# Patient Record
Sex: FEMALE | Race: WHITE | Employment: OTHER | URBAN - METROPOLITAN AREA
[De-identification: names, ages, dates, MRNs, and addresses within clinical notes are randomized per-mention and may not be internally consistent; named-entity substitution may affect disease eponyms.]

---

## 2017-01-16 ENCOUNTER — TELEPHONE (OUTPATIENT)
Dept: INTERNAL MEDICINE CLINIC | Age: 47
End: 2017-01-16

## 2017-01-16 NOTE — TELEPHONE ENCOUNTER
Pt transferred to nurse due to complain of chest pain. Pt complaining of mild chest pressure for 4 days. Pain is alleviated with ASA but returns as soon as ASA wears off. Pt denies any other symptoms, no radiating pain to shoulder, arm, back, neck or jaw. No nausea, sweats, HA or injury she is aware of. Pain not keeping her up at night. Advised of appt in office but if symptoms worse, change or she develops any of the additional symptoms discussed to be seen in ED. Pt understood and agrees to plan.

## 2017-01-17 ENCOUNTER — OFFICE VISIT (OUTPATIENT)
Dept: INTERNAL MEDICINE CLINIC | Age: 47
End: 2017-01-17

## 2017-01-17 VITALS — OXYGEN SATURATION: 97 % | RESPIRATION RATE: 20 BRPM | BODY MASS INDEX: 28.79 KG/M2 | HEIGHT: 68 IN | WEIGHT: 190 LBS

## 2017-01-17 DIAGNOSIS — R07.89 OTHER CHEST PAIN: Primary | ICD-10-CM

## 2017-01-17 DIAGNOSIS — E03.4 HYPOTHYROIDISM DUE TO ACQUIRED ATROPHY OF THYROID: ICD-10-CM

## 2017-01-17 DIAGNOSIS — E83.51 HYPOCALCEMIA: ICD-10-CM

## 2017-01-17 NOTE — MR AVS SNAPSHOT
Visit Information Date & Time Provider Department Dept. Phone Encounter #  
 1/17/2017 10:15 AM Delaney Eisenberg MD [facility] [address] [id number] Your Appointments 2/10/2017  9:20 AM  
Any with Ronnie Hdez MD  
[address] ([address]) Appt Note: follow up_ ref by Dr Sanford Moreira last seen 3/11/14 by Dr Nicole Lester snoring, migraines_ [address]  [address] [place], [place] [phone] [address] [facility] [address]  
  
    
 6/30/2017  8:40 AM  
Follow Up with Emelina Ramires MD  
[facility]) Appt Note: follow up headache  $0CP  giana  12/30/16 [place] [address] [facility] [address] [phone]  
  
   
 [place] [address] [address] [address] Upcoming Health Maintenance Date Due  
 PAP AKA CERVICAL CYTOLOGY 6/2/2017 DTaP/Tdap/Td series (2 - Td) 7/21/2026 Allergies as of 1/17/2017  Review Complete On: 1/17/2017 By: Delaney Eisenberg MD  
  
 Severity Noted Reaction Type Reactions Codeine  08/18/2010    Not Reported This Time Compazine [Prochlorperazine Edisylate]  08/18/2010    Not Reported This Time  
 Pcn [Penicillins]  08/18/2010    Not Reported This Time Prednisone  08/18/2010    Other (comments)  
 tach Current Immunizations  Reviewed on 8/23/2016 Name Date Hep B Vaccine (Adult) 8/23/2016, 7/21/2016 Influenza Vaccine 9/11/2016 Influenza Vaccine Split 10/4/2012 Tdap 7/21/2016 Not reviewed this visit You Were Diagnosed With   
  
 Codes Comments Other chest pain    -  Primary ICD-10-CM: R07.89 ICD-9-CM: 786.59 Hypothyroidism due to acquired atrophy of thyroid     ICD-10-CM: E03.4 ICD-9-CM: 244.8, 246.8 Hypocalcemia     ICD-10-CM: E83.51 
ICD-9-CM: 275.41 Vitals Resp Height(growth percentile) Weight(growth percentile) SpO2 BMI OB Status 20 5' 8\" (1.727 m) 190 lb (86.2 kg) 97% 28.89 kg/m2 Hysterectomy Smoking Status Never Smoker Vitals History BMI and BSA Data Body Mass Index Body Surface Area  
 28.89 kg/m 2 2.03 m 2 Preferred Pharmacy Pharmacy Name Phone [id number] [place], [address] [facility] [address] AT [facility] [phone] Your Updated Medication List  
  
   
This list is accurate as of: 1/17/17 11:21 AM.  Always use your most recent med list.  
  
  
  
  
 Biotin 2,500 mcg Cap Take 7,500 mcg by mouth daily. CALCIUM PO Take  by mouth. CO Q-10 10 mg Cap Generic drug:  coenzyme q10 Take  by mouth. EXCEDRIN MIGRAINE PO Take  by mouth. IMMUNE SUPPORT COMPLEX PO Take  by mouth.  
  
 levothyroxine 175 mcg tablet Commonly known as:  SYNTHROID Take 175 mcg by mouth Daily (before breakfast). OTHER Take  by mouth daily. SUMAtriptan 100 mg tablet Commonly known as:  IMITREX  
TAKE 1 TABLET BY MOUTH AS NEEDED FOR MIGRAINE AS DIRECTED * topiramate 25 mg tablet Commonly known as:  TOPAMAX TAKE 3 TABLETS BY MOUTH EVERY NIGHT AT BEDTIME FOR MIGRIANE PREVENTION  
  
 * topiramate 100 mg tablet Commonly known as:  TOPAMAX Take 1 Tab by mouth nightly. * Notice: This list has 2 medication(s) that are the same as other medications prescribed for you. Read the directions carefully, and ask your doctor or other care provider to review them with you. We Performed the Following AMB POC EKG ROUTINE W/ 12 LEADS, INTER & REP [10935 CPT(R)] CBC W/O DIFF [65883 CPT(R)] LIPID PANEL [82208 CPT(R)] METABOLIC PANEL, COMPREHENSIVE [47482 CPT(R)] SED RATE (ESR) [id number] CPT(R)] T4, FREE [id number] CPT(R)] TSH 3RD GENERATION [63209 CPT(R)] VITAMIN D, 25 HYDROXY [id number] CPT(R)] Introducing [facility] & HEALTH SERVICES! Dear Jorge Luis Avina: Thank you for requesting a [employer] account. Our records indicate that you already have an active [employer] account. You can access your account anytime at [email or web address] Did you know that you can access your hospital and ER discharge instructions at any time in [employer]? You can also review all of your test results from your hospital stay or ER visit. Additional Information If you have questions, please visit the Frequently Asked Questions section of the [employer] website at [email or web address] Remember, [employer] is NOT to be used for urgent needs. For medical emergencies, dial 911. Now available from your iPhone and Android! Please provide this summary of care documentation to your next provider. Your primary care clinician is listed as Adri Villela. If you have any questions after today's visit, please call [phone].

## 2017-01-17 NOTE — PROGRESS NOTES
Chief Complaint   Patient presents with    Chest Pain    Nausea     Chest pain  Pt reports chest pain about 5 days ago. It is peristent but mild. She had similar symptoms > 5 years associated with her thyroid cancer. Pt has last seen Dr. Block > 1 year and has not had her thryoid or calcium levels checked. She has been doing weight and CV for several years. She is in the gym 1 hour 3 times/week.  and part time . She tried to exercise but makes her feel uneasy. Nausea  She report new onset this am. She denies pnd and sx worse when lying down. Past Medical History   Diagnosis Date    [facility] [address]     Headache     Hypothyroid 8/18/2010    Thyroid cancer ([facility] [address] 11/07    Thyroid disease      Past Surgical History   Procedure Laterality Date    Pr thyroidectomy  11/07    Hx zakia and bso       ovarian cyst    Hx heent       thyroidectomy     Social History     Social History    Marital status:      Spouse name: N/A    Number of children: N/A    Years of education: N/A     Social History Main Topics    Smoking status: Never Smoker    Smokeless tobacco: Never Used    Alcohol use No    Drug use: No    Sexual activity: Yes     Partners: Male     Other Topics Concern    None     Social History Narrative     Family History   Problem Relation Age of Onset    Cancer Mother     Headache Mother     Cancer Sister     Cancer Father     Headache Father     Stroke Maternal Grandmother      Current Outpatient Prescriptions   Medication Sig Dispense Refill    topiramate (TOPAMAX) 100 mg tablet Take 1 Tab by mouth nightly. 30 Tab 6    SUMAtriptan (IMITREX) 100 mg tablet TAKE 1 TABLET BY MOUTH AS NEEDED FOR MIGRAINE AS DIRECTED 12 Tab 6    OTHER Take  by mouth daily.  levothyroxine (SYNTHROID) 175 mcg tablet Take 175 mcg by mouth Daily (before breakfast).  coenzyme q10 (CO Q-10) 10 mg cap Take  by mouth.       VIT C/ZN/K.GINSG/МАРИЯ/HRB62 (IMMUNE SUPPORT COMPLEX PO) Take  by mouth.  CALCIUM PO Take  by mouth.  topiramate (TOPAMAX) 25 mg tablet TAKE 3 TABLETS BY MOUTH EVERY NIGHT AT BEDTIME FOR MIGRIANE PREVENTION 270 Tab 1    Biotin 2,500 mcg cap Take 7,500 mcg by mouth daily.  ASPIRIN/ACETAMINOPHEN/CAFFEINE (EXCEDRIN MIGRAINE PO) Take  by mouth. Allergies   Allergen Reactions    Codeine Not Reported This Time    Compazine [Prochlorperazine Edisylate] Not Reported This Time    Pcn [Penicillins] Not Reported This Time    Prednisone Other (comments)     tach       Review of Systems - General ROS: positive for  - fatigue  negative for - chills, fever, hot flashes, malaise, night sweats, sleep disturbance, weight gain or weight loss  Cardiovascular ROS: positive for - chest pain  negative for - dyspnea on exertion, edema, irregular heartbeat, loss of consciousness, murmur, orthopnea, palpitations, rapid heart rate or shortness of breath  Respiratory ROS: no cough, shortness of breath, or wheezing    Visit Vitals    Resp 20    Ht 5' 8\" (1.727 m)    Wt 190 lb (86.2 kg)    SpO2 97%    BMI 28.89 kg/m2     General Appearance:  Well developed, well nourished,alert and oriented x 3, and individual in no acute distress. Ears/Nose/Mouth/Throat:   Hearing grossly normal.         Neck: Supple, no lad, no bruits   Chest:   Lungs clear to auscultation bilaterally. pain on palpation right anterior ribs 4-5 supine but not on standing   Cardiovascular:  Regular rate and rhythm, S1, S2 normal, no murmur. Abdomen:   Soft, non-tender, bowel sounds are active. Extremities: No edema bilaterally. Skin: Warm and dry, no suspicious lesions                 Standra Mcelroy was seen today for chest pain and nausea.     Diagnoses and all orders for this visit:    Other chest pain  ekg wnl, don't think stress related  Works out at gym, ekg wnl will do labs    -     AMB POC EKG ROUTINE W/ 12 LEADS, INTER & REP  -     CBC W/O DIFF  -     METABOLIC PANEL, COMPREHENSIVE  -     SED RATE (ESR)  -     LIPID PANEL    Hypothyroidism due to acquired atrophy of thyroid  -     TSH 3RD GENERATION  -     T4, FREE    Hypocalcemia  -     VITAMIN D, 25 HYDROXY          I spent 15 min with this patient and >50% of the time was spent on counseling and management of atypical cp. This note will not be viewable in [address]

## 2017-01-18 LAB
25(OH)D3+25(OH)D2 SERPL-MCNC: 35 NG/ML (ref 30–100)
ALBUMIN SERPL-MCNC: 4.4 G/DL (ref 3.5–5.5)
ALBUMIN/GLOB SERPL: 1.9 {RATIO} (ref 1.1–2.5)
ALP SERPL-CCNC: 53 IU/L (ref 39–117)
ALT SERPL-CCNC: 24 IU/L (ref 0–32)
AST SERPL-CCNC: 20 IU/L (ref 0–40)
BILIRUB SERPL-MCNC: 0.3 MG/DL (ref 0–1.2)
BUN SERPL-MCNC: 21 MG/DL (ref 6–24)
BUN/CREAT SERPL: 18 (ref 9–23)
CALCIUM SERPL-MCNC: 9.2 MG/DL (ref 8.7–10.2)
CHLORIDE SERPL-SCNC: 106 MMOL/L (ref 96–106)
CO2 SERPL-SCNC: 21 MMOL/L (ref 18–29)
CREAT SERPL-MCNC: 1.19 MG/DL (ref 0.57–1)
ERYTHROCYTE [DISTWIDTH] IN BLOOD BY AUTOMATED COUNT: 13.4 % (ref 12.3–15.4)
ERYTHROCYTE [SEDIMENTATION RATE] IN BLOOD BY WESTERGREN METHOD: 2 MM/HR (ref 0–32)
GLOBULIN SER CALC-MCNC: 2.3 G/DL (ref 1.5–4.5)
GLUCOSE SERPL-MCNC: 88 MG/DL (ref 65–99)
HCT VFR BLD AUTO: 42.8 % (ref 34–46.6)
HGB BLD-MCNC: 14.2 G/DL (ref 11.1–15.9)
INTERPRETATION: NORMAL
MCH RBC QN AUTO: 30 PG (ref 26.6–33)
MCHC RBC AUTO-ENTMCNC: 33.2 G/DL (ref 31.5–35.7)
MCV RBC AUTO: 91 FL (ref 79–97)
PLATELET # BLD AUTO: 277 X10E3/UL (ref 150–379)
POTASSIUM SERPL-SCNC: 4.5 MMOL/L (ref 3.5–5.2)
PROT SERPL-MCNC: 6.7 G/DL (ref 6–8.5)
RBC # BLD AUTO: 4.73 X10E6/UL (ref 3.77–5.28)
SODIUM SERPL-SCNC: 142 MMOL/L (ref 134–144)
T4 FREE SERPL-MCNC: 1.41 NG/DL (ref 0.82–1.77)
TSH SERPL DL<=0.005 MIU/L-ACNC: 2.9 UIU/ML (ref 0.45–4.5)
WBC # BLD AUTO: 5.6 X10E3/UL (ref 3.4–10.8)

## 2017-01-19 ENCOUNTER — TELEPHONE (OUTPATIENT)
Dept: INTERNAL MEDICINE CLINIC | Age: 47
End: 2017-01-19

## 2017-01-19 DIAGNOSIS — E89.0 POSTABLATIVE HYPOTHYROIDISM: ICD-10-CM

## 2017-01-19 RX ORDER — LEVOTHYROXINE SODIUM 200 UG/1
175 TABLET ORAL
Qty: 30 TAB | Refills: 2 | Status: SHIPPED | OUTPATIENT
Start: 2017-01-19

## 2017-01-19 NOTE — TELEPHONE ENCOUNTER
Patient would to discuss recent lab results and wants a referral for a [facility] endocrinologist because her insurance isn't taken by the endocrinologist that she used to go to. [phone]

## 2017-02-01 ENCOUNTER — DOCUMENTATION ONLY (OUTPATIENT)
Dept: SLEEP MEDICINE | Age: 47
End: 2017-02-01

## 2017-02-01 ENCOUNTER — TELEPHONE (OUTPATIENT)
Dept: INTERNAL MEDICINE CLINIC | Age: 47
End: 2017-02-01

## 2017-02-01 NOTE — TELEPHONE ENCOUNTER
Referral Request.        Dr. Jenny Lizama  [address] [id number]  [phone])[phone] (x)[phone]      Appt.  2/3/17 10am    Follow up visit for sleep disorder    dAry Ellis -  [phone]

## 2017-02-03 ENCOUNTER — OFFICE VISIT (OUTPATIENT)
Dept: SLEEP MEDICINE | Age: 47
End: 2017-02-03

## 2017-02-03 ENCOUNTER — DOCUMENTATION ONLY (OUTPATIENT)
Dept: SLEEP MEDICINE | Age: 47
End: 2017-02-03

## 2017-02-03 VITALS
WEIGHT: 188 LBS | HEIGHT: 68 IN | DIASTOLIC BLOOD PRESSURE: 77 MMHG | SYSTOLIC BLOOD PRESSURE: 135 MMHG | BODY MASS INDEX: 28.49 KG/M2 | OXYGEN SATURATION: 99 % | TEMPERATURE: 99 F | HEART RATE: 71 BPM

## 2017-02-03 DIAGNOSIS — R06.83 PRIMARY SNORING: ICD-10-CM

## 2017-02-03 DIAGNOSIS — E66.3 OVERWEIGHT (BMI 25.0-29.9): ICD-10-CM

## 2017-02-03 DIAGNOSIS — G47.33 OSA (OBSTRUCTIVE SLEEP APNEA): Primary | ICD-10-CM

## 2017-02-03 NOTE — PROGRESS NOTES
[address] Tamara He[address], [address]  Tel.  [phone]  Fax. 100 [facility] 60  [place], [address] [facility]  Tel.  [phone]  Fax. [phone] [address] [facility] [address] [place] [address]  Tel.  [phone]  Fax. [phone]         Subjective: Nicole Eng is an 48 y.o. female referred for evaluation for a sleep disorder. She complains of excessive daytime sleepiness associated with awakening in the middle of the night because of snoring and headache. Symptoms began several years ago, gradually worsening since that time. She usually can fall asleep in 5 minutes. Family or house members note snoring, choking. She denies completely or partially paralyzed while falling asleep or waking up. Nicole Eng does not wake up frequently at night. She is not bothered by waking up too early and left unable to get back to sleep. She actually sleeps about 8 (7-9 hours) hours at night and wakes up about 0 times during the night. She does not work shifts:  . Simon Toure indicates she does not get too little sleep at night. Her bedtime is 2300. She awakens at [facility] [address] ([address]T). She does not take naps. . She has the following observed behaviors: Pauses in breathing;  . Other remarks: snoring(unspecified), waking with a gasp or snort    [place] Sleepiness Score: 5   which reflect moderate daytime drowsiness. Allergies   Allergen Reactions    Codeine Not Reported This Time    Compazine [Prochlorperazine Edisylate] Not Reported This Time    Pcn [Penicillins] Not Reported This Time    Prednisone Other (comments)     tach         Current Outpatient Prescriptions:     levothyroxine (SYNTHROID) 200 mcg tablet, Take 1 Tab by mouth Daily (before breakfast). , Disp: 30 Tab, Rfl: 2    topiramate (TOPAMAX) 100 mg tablet, Take 1 Tab by mouth nightly., Disp: 30 Tab, Rfl: 6    SUMAtriptan (IMITREX) 100 mg tablet, TAKE 1 TABLET BY MOUTH AS NEEDED FOR MIGRAINE AS DIRECTED, Disp: 12 Tab, Rfl: 6    Biotin 2,500 mcg cap, Take 7,500 mcg by mouth daily. , Disp: , Rfl:     OTHER, Take  by mouth daily. , Disp: , Rfl:     ASPIRIN/ACETAMINOPHEN/CAFFEINE (EXCEDRIN MIGRAINE PO), Take  by mouth., Disp: , Rfl:     coenzyme q10 (CO Q-10) 10 mg cap, Take  by mouth., Disp: , Rfl:     VIT C/ZN/K.GINSG/МАРИЯ/HRB62 (IMMUNE SUPPORT COMPLEX PO), Take  by mouth., Disp: , Rfl:     CALCIUM PO, Take  by mouth.  , Disp: , Rfl:     topiramate (TOPAMAX) 25 mg tablet, TAKE 3 TABLETS BY MOUTH EVERY NIGHT AT BEDTIME FOR MIGRIANE PREVENTION, Disp: 270 Tab, Rfl: 1     She  has a past medical history of Cancer ([facility] [address] Headache; Hypothyroid (8/18/2010); Thyroid cancer ([facility] [address] (11/07); and Thyroid disease. She also has no past medical history of Abuse; Anemia NEC; Arrhythmia; Arthritis; Asthma; Autoimmune disease ([address] CAD (coronary artery disease); Calculus of kidney; Chronic kidney disease; Chronic pain; Congestive heart failure, unspecified; Contact dermatitis and other eczema, due to unspecified cause; COPD; Depression; Diabetes ([address] GERD (gastroesophageal reflux disease); Headache(784.0); Hypercholesterolemia; Hypertension; Liver disease; Psychotic disorder; PUD (peptic ulcer disease); Seizures ([facility] [address] Stroke [facility]); Thromboembolus ([facility] [address] or Trauma. She  has a past surgical history that includes thyroidectomy (11/07); zakia and bso; and heent. She family history includes Cancer in her father, mother, and sister; Headache in her father and mother; Stroke in her maternal grandmother. She  reports that she has never smoked. She has never used smokeless tobacco. She reports that she does not drink alcohol or use illicit drugs. Review of Systems:  Constitutional:  No significant weight loss or weight gain. Eyes:  No blurred vision.   CVS:  No significant chest pain  Pulm:  No significant shortness of breath  GI:  No significant nausea or vomiting  :  No significant nocturia  Musculoskeletal:  No significant joint pain at night  Skin:  No significant rashes  Neuro:  No significant dizziness   Psych:  No active mood issues    Sleep Review of Systems: notable for no difficulty falling asleep; infrequent awakenings at night;  regular dreaming noted; no nightmares ; no early morning headaches; no memory problems; no concentration issues; no history of any automobile or occupational accidents due to daytime drowsiness. Objective:     Visit Vitals    /77    Pulse 71    Temp 99 °F (37.2 °C)    Ht 5' 8\" (1.727 m)    Wt 188 lb (85.3 kg)    SpO2 99%    BMI 28.59 kg/m2         General:   Not in acute distress   Eyes:  Anicteric sclerae, no obvious strabismus   Nose:  No obvious nasal septum deviation    Oropharynx:   Class 3 oropharyngeal outlet, thick tongue base, enlarged and boggy uvula, low-lying soft palate, narrow tonsilo-pharyngeal pilars   Tonsils:   tonsils are absent   Neck:   Neck circ. in \"inches\": 16; midline trachea   Chest/Lungs:  Equal lung expansion, clear on auscultation    CVS:  Normal rate, regular rhythm; no JVD   Skin:  Warm to touch; no obvious rashes   Neuro:  No focal deficits ; no obvious tremor    Psych:  Normal affect,  normal countenance;          Assessment:       ICD-10-CM ICD-9-CM    1. ALFREDO (obstructive sleep apnea) G47.33 327.23    2. Primary snoring R06.83 786.09    3. Overweight (BMI 25.0-29. 9) E66.3 278.02          Plan:     * The patient currently has a Moderate Risk for having sleep apnea. STOP-BANG score 4.  * PSG was ordered for initial evaluation. * She was provided information on sleep apnea including coresponding risk factors and the importance of proper treatment. * Counseling was provided regarding proper sleep hygiene and safe driving. * She is interested in surgical procedures for the treatment of sleep apnea. I have reviewed/discussed the above normal BMI with the patient.   I have recommended the following interventions: dietary management education, guidance, and counseling . The plan is as follows: I have counseled this patient on diet and exercise regimens. .    Thank you for allowing us to participate in your patient's medical care. We'll keep you updated on these investigations.     Hill Cornejo M.D.  (electronically signed)  Diplomate in Sleep Medicine, [facility]

## 2017-02-03 NOTE — MR AVS SNAPSHOT
Visit Information Date & Time Provider Department Dept. Phone Encounter #  
 2/3/2017 10:00 AM Neptali Sawyer, [address] [id number] Follow-up Instructions Routing History Follow-up and Disposition History Your Appointments 6/30/2017  8:40 AM  
Follow Up with Phillip Guerrero MD  
[facility]) Appt Note: follow up headache  $0CP  giana  12/30/16 [place] [address] [address] [phone]  
  
   
 [place] [address] [id number] [address] Upcoming Health Maintenance Date Due  
 PAP AKA CERVICAL CYTOLOGY 6/2/2017 DTaP/Tdap/Td series (2 - Td) 7/21/2026 Allergies as of 2/3/2017  Review Complete On: 2/3/2017 By: Neptali Sawyer MD  
  
 Severity Noted Reaction Type Reactions Codeine  08/18/2010    Not Reported This Time Compazine [Prochlorperazine Edisylate]  08/18/2010    Not Reported This Time  
 Pcn [Penicillins]  08/18/2010    Not Reported This Time Prednisone  08/18/2010    Other (comments)  
 tach Current Immunizations  Reviewed on 8/23/2016 Name Date Hep B Vaccine (Adult) 8/23/2016, 7/21/2016 Influenza Vaccine 9/11/2016 Influenza Vaccine Split 10/4/2012 Tdap 7/21/2016 Not reviewed this visit You Were Diagnosed With   
  
 Codes Comments ALFREDO (obstructive sleep apnea)    -  Primary ICD-10-CM: G47.33 
ICD-9-CM: 327.23 Primary snoring     ICD-10-CM: R06.83 
ICD-9-CM: 786.09 Overweight (BMI 25.0-29. 9)     ICD-10-CM: D29.5 ICD-9-CM: 278.02 Vitals BP Pulse Temp Height(growth percentile) Weight(growth percentile) SpO2  
 135/77 71 99 °F (37.2 °C) 5' 8\" (1.727 m) 188 lb (85.3 kg) 99% BMI OB Status Smoking Status 28.59 kg/m2 Hysterectomy Never Smoker Vitals History BMI and BSA Data Body Mass Index Body Surface Area 28.59 kg/m 2 2.02 m 2 Preferred Pharmacy Pharmacy Name Phone [id number] [place], [address] AT [facility] [phone] Your Updated Medication List  
  
   
This list is accurate as of: 2/3/17 11:37 AM.  Always use your most recent med list.  
  
  
  
  
 Biotin 2,500 mcg Cap Take 7,500 mcg by mouth daily. CALCIUM PO Take  by mouth. CO Q-10 10 mg Cap Generic drug:  coenzyme q10 Take  by mouth. EXCEDRIN MIGRAINE PO Take  by mouth. IMMUNE SUPPORT COMPLEX PO Take  by mouth.  
  
 levothyroxine 200 mcg tablet Commonly known as:  SYNTHROID Take 1 Tab by mouth Daily (before breakfast). OTHER Take  by mouth daily. SUMAtriptan 100 mg tablet Commonly known as:  IMITREX  
TAKE 1 TABLET BY MOUTH AS NEEDED FOR MIGRAINE AS DIRECTED * topiramate 25 mg tablet Commonly known as:  TOPAMAX TAKE 3 TABLETS BY MOUTH EVERY NIGHT AT BEDTIME FOR MIGRIANE PREVENTION  
  
 * topiramate 100 mg tablet Commonly known as:  TOPAMAX Take 1 Tab by mouth nightly. * Notice: This list has 2 medication(s) that are the same as other medications prescribed for you. Read the directions carefully, and ask your doctor or other care provider to review them with you. We Performed the Following REFERRAL TO ENT-OTOLARYNGOLOGY [UKG17 Custom] Comments:  
 Please evaluate patient for primary snoring for LAUP. SLEEP STUDY UNATTENDED, RESPIRATORY EFFORT  [68733 CPT(R)] Referral Information Referral ID Referred By Referred To  
  
 [id number] Leonidas Isabel MD   
   [address] [place] [address] Ear Nose and Th   
   [address] [place] [address] Phone: [phone] Fax: [phone] Visits Status Start Date End Date 1 New Request 2/3/17 2/3/18  If your referral has a status of pending review or denied, additional information will be sent to support the outcome of this decision. Introducing [facility] & HEALTH SERVICES! Dear Patrick Roman: Thank you for requesting a [employer] account. Our records indicate that you already have an active [employer] account. You can access your account anytime at [email or web address] Did you know that you can access your hospital and ER discharge instructions at any time in [employer]? You can also review all of your test results from your hospital stay or ER visit. Additional Information If you have questions, please visit the Frequently Asked Questions section of the [employer] website at [email or web address] Remember, [employer] is NOT to be used for urgent needs. For medical emergencies, dial 911. Now available from your iPhone and Android! Please provide this summary of care documentation to your next provider. Your primary care clinician is listed as Winifred Scruggs. If you have any questions after today's visit, please call [phone].

## 2017-02-04 ENCOUNTER — HOSPITAL ENCOUNTER (OUTPATIENT)
Dept: SLEEP MEDICINE | Age: 47
Discharge: HOME | End: 2017-02-04
Payer: COMMERCIAL

## 2017-02-04 PROCEDURE — 95806 SLEEP STUDY UNATT&RESP EFFT: CPT | Performed by: INTERNAL MEDICINE

## 2017-02-06 ENCOUNTER — TELEPHONE (OUTPATIENT)
Dept: SLEEP MEDICINE | Age: 47
End: 2017-02-06

## 2017-02-06 NOTE — PROGRESS NOTES
· Patient was educated on proper hookup and operation of the HST. · Instruction forms and documentation were reviewed and signed. · The patient demonstrated good understanding of the HST. · General information regarding operations and maintenance of the device was provided. · She was provided information on sleep apnea including coresponding risk factors and the importance of proper treatment. · Follow-up appointment was made to return the HST. She will be contacted once the results have been reviewed. · She was asked to contact our office for any problems regarding her home sleep test study.

## 2017-02-06 NOTE — TELEPHONE ENCOUNTER
[address] [facility]. [place], [address]  Tel.  [phone]  Fax. 100 [facility] 60  [place], [address] [facility]  Tel.  [phone]  Fax. [phone] [address]  Tel.  [phone]  Fax. [phone]   Polysomnogram was performed and the results of the study were explained to the patient. Please refer to interpretation report for further details. Apnea/Hypopnea index of 0.3 which indicates insignificant apnea. She continues to have snoring. * We have recommended a dedicated weight loss and exercise regiment as significant weight reduction has been shown to reduce risk of  developing significant obstructive sleep apnea. * Counseling was provided regarding safe driving and proper sleep hygiene, with particular attention to maintaining lateral positioning while sleeping with the use of bed pillows to reduce apnic events at night. * She was asked to contact our office at any time for further questions regarding any sleep symptoms.     Noam Bhagat M.D.  (electronically signed)  Diplomate in Sleep Medicine, [facility]

## 2017-02-07 ENCOUNTER — TELEPHONE (OUTPATIENT)
Dept: INTERNAL MEDICINE CLINIC | Age: 47
End: 2017-02-07

## 2017-02-07 ENCOUNTER — DOCUMENTATION ONLY (OUTPATIENT)
Dept: SLEEP MEDICINE | Age: 47
End: 2017-02-07

## 2017-02-07 DIAGNOSIS — E03.9 HYPOTHYROIDISM, UNSPECIFIED TYPE: Primary | ICD-10-CM

## 2017-02-07 NOTE — TELEPHONE ENCOUNTER
Patient has an appt on 2/8/17 at 2pm    Dr. Paulie Colin Endocrinology and [address]  [address] [address]  Phone:  [phone]  Fax Number:  [phone]    Patient is being seen for elevated thyroid levels. She is post-cancer.      [address] [facility] ([facility] [[id number]]  ID/Cert# [id number]

## 2017-02-23 ENCOUNTER — OFFICE VISIT (OUTPATIENT)
Dept: INTERNAL MEDICINE CLINIC | Age: 47
End: 2017-02-23

## 2017-02-23 VITALS
BODY MASS INDEX: 28.37 KG/M2 | DIASTOLIC BLOOD PRESSURE: 63 MMHG | TEMPERATURE: 98.6 F | HEIGHT: 68 IN | RESPIRATION RATE: 16 BRPM | WEIGHT: 187.2 LBS | HEART RATE: 94 BPM | SYSTOLIC BLOOD PRESSURE: 114 MMHG | OXYGEN SATURATION: 99 %

## 2017-02-23 DIAGNOSIS — K90.41 GLUTEN INTOLERANCE: Primary | ICD-10-CM

## 2017-02-23 DIAGNOSIS — R21 RASH: ICD-10-CM

## 2017-02-23 DIAGNOSIS — G43.009 MIGRAINE WITHOUT AURA AND WITHOUT STATUS MIGRAINOSUS, NOT INTRACTABLE: ICD-10-CM

## 2017-02-23 DIAGNOSIS — E03.4 HYPOTHYROIDISM DUE TO ACQUIRED ATROPHY OF THYROID: ICD-10-CM

## 2017-02-23 NOTE — MR AVS SNAPSHOT
Visit Information Date & Time Provider Department Dept. Phone Encounter #  
 2/23/2017  9:45 AM Александр Castillo MD [facility] [address] [id number] Your Appointments 6/30/2017  8:40 AM  
Follow Up with Lauro Rodriges MD  
[facility] Appt Note: follow up headache  $0CP  giana  12/30/16 [place] [address] [facility] [address] [phone]  
  
   
 [place] [address] [id number] [address] Upcoming Health Maintenance Date Due  
 PAP AKA CERVICAL CYTOLOGY 6/2/2017 DTaP/Tdap/Td series (2 - Td) 7/21/2026 Allergies as of 2/23/2017  Review Complete On: 2/23/2017 By: Ambika Monte LPN Severity Noted Reaction Type Reactions Codeine  08/18/2010    Not Reported This Time Compazine [Prochlorperazine Edisylate]  08/18/2010    Not Reported This Time  
 Pcn [Penicillins]  08/18/2010    Not Reported This Time Prednisone  08/18/2010    Other (comments)  
 tach Current Immunizations  Reviewed on 8/23/2016 Name Date Hep B Vaccine (Adult) 8/23/2016, 7/21/2016 Influenza Vaccine 9/11/2016 Influenza Vaccine Split 10/4/2012 Tdap 7/21/2016 Not reviewed this visit You Were Diagnosed With   
  
 Codes Comments Gluten intolerance    -  Primary ICD-10-CM: K90.0 ICD-9-CM: 579.0 Migraine without aura and without status migrainosus, not intractable     ICD-10-CM: W40.465 ICD-9-CM: 346.10 Hypothyroidism due to acquired atrophy of thyroid     ICD-10-CM: E03.4 ICD-9-CM: 244.8, 246.8 Rash     ICD-10-CM: R21 
ICD-9-CM: 782.1 Vitals BP  
  
  
  
  
  
 114/63 (BP 1 Location: Left arm, BP Patient Position: Sitting) Vitals History BMI and BSA Data Body Mass Index Body Surface Area  
 28.46 kg/m 2 2.02 m 2 Preferred Pharmacy Pharmacy Name Phone [id number] [address] [facility] [phone] Your Updated Medication List  
  
   
This list is accurate as of: 2/23/17 10:18 AM.  Always use your most recent med list.  
  
  
  
  
 Biotin 2,500 mcg Cap Take 7,500 mcg by mouth daily. CALCIUM PO Take  by mouth. CO Q-10 10 mg Cap Generic drug:  coenzyme q10 Take  by mouth. EXCEDRIN MIGRAINE PO Take  by mouth. IMMUNE SUPPORT COMPLEX PO Take  by mouth.  
  
 levothyroxine 200 mcg tablet Commonly known as:  SYNTHROID Take 1 Tab by mouth Daily (before breakfast). OTHER Take  by mouth daily. SUMAtriptan 100 mg tablet Commonly known as:  IMITREX  
TAKE 1 TABLET BY MOUTH AS NEEDED FOR MIGRAINE AS DIRECTED * topiramate 25 mg tablet Commonly known as:  TOPAMAX TAKE 3 TABLETS BY MOUTH EVERY NIGHT AT BEDTIME FOR MIGRIANE PREVENTION  
  
 * topiramate 100 mg tablet Commonly known as:  TOPAMAX Take 1 Tab by mouth nightly. * Notice: This list has 2 medication(s) that are the same as other medications prescribed for you. Read the directions carefully, and ask your doctor or other care provider to review them with you. Introducing [facility]! Dear Gonzalo Monsalve: Thank you for requesting a [employer] account. Our records indicate that you already have an active [employer] Buddy account. You can access your account anytime at [email or web address] Did you know that you can access your hospital and ER discharge instructions at any time in Heart Buddy? You can also review all of your test results from your hospital stay or ER visit. Additional Information If you have questions, please visit the Frequently Asked Questions section of the [employer] Buddy website at [email or web address] Remember, Heart Buddy is NOT to be used for urgent needs. For medical emergencies, dial 911. Now available from your iPhone and Android! Please provide this summary of care documentation to your next provider. Your primary care clinician is listed as Elisabeth Elizabeth. If you have any questions after today's visit, please call [phone].

## 2017-02-23 NOTE — PROGRESS NOTES
HISTORY OF PRESENT ILLNESS  Mihai Huerta is a 48 y.o. female. HPI     Pt saw Dr. Yissel Torres in 01/2017 with complaints of chest pain and nausea. Her TSH was elevated and pt followed up with Dr. Melvin Leary. Dr. Melvin Leary was suspicious that because she had Hashimoto's she may have Celiac as well. She states that the blood test was not ordered because it is always negative. She states that her TSH has decreased. Pt has been eating gluten free for a few weeks and noticed by day 4 of gluten free diet that her headache resolved, she no longer had abdominal/gallbladder type pain, no bloody stools, and her rash resolved. Pt states that she can get a tax write off for gluten free food if she has a diagnosis. Hypothyroidism:  Lab Results   Component Value Date/Time    TSH 2.900 01/17/2017 11:57 AM   Thyroid ROS: denies fatigue, weight changes, heat/cold intolerance, CVS symptoms. She complains of urinary incontinence with sneezing and exercising. Review of Systems   All other systems reviewed and are negative. Physical Exam   Constitutional: She is oriented to person, place, and time. She appears well-developed and well-nourished. HENT:   Head: Normocephalic and atraumatic. Right Ear: External ear normal.   Left Ear: External ear normal.   Nose: Nose normal.   Mouth/Throat: Oropharynx is clear and moist.   Eyes: Conjunctivae and EOM are normal.   Neck: Normal range of motion. Neck supple. Carotid bruit is not present. No thyroid mass and no thyromegaly present. Cardiovascular: Normal rate, regular rhythm, S1 normal, S2 normal, normal heart sounds and intact distal pulses. Pulmonary/Chest: Effort normal and breath sounds normal.   Abdominal: Soft. Normal appearance and bowel sounds are normal. There is no hepatosplenomegaly. There is no tenderness. Musculoskeletal: Normal range of motion. Neurological: She is alert and oriented to person, place, and time. She has normal strength.  No cranial nerve deficit or sensory deficit. Coordination normal.   Skin: Skin is warm, dry and intact. No abrasion and no rash noted. Psychiatric: She has a normal mood and affect. Her behavior is normal. Judgment and thought content normal.   Nursing note and vitals reviewed. ASSESSMENT and PLAN  Christiano Jacinto was seen today for follow-up. Diagnoses and all orders for this visit:    Gluten intolerance  Headache, abdominal pain, bloody stools, and rash have resolved with gluten free diet. It is recommended that pt continue gluten free diet due to secondary medical issues that occur with gluten. I told pt that she can incorporate a moderate amount of gluten into her diet although she needs to find the right balance. I told pt that the only way to truly diagnosis Celiac is to have biopsied performed although the treatment would still be gluten free diet. I did tell pt that celiac can affect the absorption of B12. Migraine without aura and without status migrainosus, not intractable  Has improved with gluten free diet. Hypothyroidism due to acquired atrophy of thyroid  Stable- Continue current medications. Rash  Has improved with gluten free diet. I wrote pt a prescription for pelvic floor physical therapy for urinary incontinence. I did not order labs as this is being followed by endocrinologist.    lab results and schedule of future lab studies reviewed with patient  reviewed diet, exercise and weight control  reviewed medications and side effects in detail     Written by Luis Daniel Jo, as dictated by Moreno Gomes MD.     Current diagnosis and concerns discussed with pt at length. Understands risks and benefits or current treatment plan and medications and accepts the treatment and medication with any possible risks. Pt asks appropriate questions which were answered. Pt instructed to call with any concerns or problems.

## 2017-03-28 ENCOUNTER — OFFICE VISIT (OUTPATIENT)
Dept: INTERNAL MEDICINE CLINIC | Age: 47
End: 2017-03-28

## 2017-03-28 VITALS
HEIGHT: 68 IN | DIASTOLIC BLOOD PRESSURE: 75 MMHG | BODY MASS INDEX: 28.25 KG/M2 | TEMPERATURE: 98.2 F | WEIGHT: 186.4 LBS | SYSTOLIC BLOOD PRESSURE: 125 MMHG | HEART RATE: 93 BPM | OXYGEN SATURATION: 99 % | RESPIRATION RATE: 14 BRPM

## 2017-03-28 DIAGNOSIS — K62.5 RECTAL BLEEDING: Primary | ICD-10-CM

## 2017-03-28 DIAGNOSIS — K90.41 GLUTEN INTOLERANCE: ICD-10-CM

## 2017-03-28 DIAGNOSIS — T78.40XA ALLERGIC REACTION, INITIAL ENCOUNTER: ICD-10-CM

## 2017-03-28 RX ORDER — BISMUTH SUBSALICYLATE 262 MG
1 TABLET,CHEWABLE ORAL DAILY
COMMUNITY

## 2017-03-28 NOTE — PROGRESS NOTES
HISTORY OF PRESENT ILLNESS  Chyna Mejia is a 48 y.o. female. HPI     Reports when she eats even a small amount of gluten she develops blisters on her body. She states that when she was in [place] she had a biscuit and developed blisters all over her body. She was seen by a physician at [facility]gent Care clinic and was prescribed Prednisone. Pt states that the same day she started taking Prednisone she had bloody diarrhea. Reports blood was bright red and she has had episode of bloody diarrhea since this. Pt has not eaten gluten since this episode. Pt does not feel hemorrhoid. Pt states that when she touched flour she had a rash on her hand. Reports certain things she eats makes her throat feel prickly. Pt wonders if Hashimoto's puts her at risk for developing other autoimmune issues. Pt then had cancer and had thyroid removed. Reports recently her TSH went up to 3 and her dose was changed and TSH decreased. Dr. Juel Cheadle told pt that she should follow up with me for Celiac testing. Review of Systems   All other systems reviewed and are negative. Physical Exam   Constitutional: She is oriented to person, place, and time. She appears well-developed and well-nourished. HENT:   Head: Normocephalic and atraumatic. Right Ear: External ear normal.   Left Ear: External ear normal.   Nose: Nose normal.   Mouth/Throat: Oropharynx is clear and moist.   Eyes: Conjunctivae and EOM are normal.   Neck: Normal range of motion. Neck supple. Carotid bruit is not present. No thyroid mass and no thyromegaly present. Cardiovascular: Normal rate, regular rhythm, S1 normal, S2 normal, normal heart sounds and intact distal pulses. Pulmonary/Chest: Effort normal and breath sounds normal.   Abdominal: Soft. Normal appearance and bowel sounds are normal. There is no hepatosplenomegaly. There is no tenderness. Musculoskeletal: Normal range of motion. Neurological: She is alert and oriented to person, place, and time.  She has normal strength. No cranial nerve deficit or sensory deficit. Coordination normal.   Skin: Skin is warm, dry and intact. No abrasion and no rash noted. Psychiatric: She has a normal mood and affect. Her behavior is normal. Judgment and thought content normal.   Nursing note and vitals reviewed. ASSESSMENT and PLAN  Tavia Vega was seen today for blister. Diagnoses and all orders for this visit:    Rectal bleeding  I told pt that bright red blood is coming from a superficial area and Prednisone can irritate the gut. She should make an appointment with GI (provided pt with Dr. Hugh Beverly contact info) to have colonoscopy performed as she did have some blood in her stool.  -     REFERRAL TO GASTROENTEROLOGY  -     CBC W/O DIFF    Gluten intolerance  I told pt that she was exposed to something that is causing her body to react. I ordered celiac lab work although told pt that if this is negative the next step is to see GI. She put hand in flour and developed a rash therefore symptoms sound like an allergic reaction. She should make an appointment for allergy testing (provided pt with list of allergists). -     CELIAC ANTIBODY PROFILE  -     BOWEL DISORDERS CASCADE    lab results and schedule of future lab studies reviewed with patient  reviewed diet, exercise and weight control    Written by Terri Rachel, as dictated by Winifred Kirkland MD.     Current diagnosis and concerns discussed with pt at length. Understands risks and benefits or current treatment plan and medications and accepts the treatment and medication with any possible risks. Pt asks appropriate questions which were answered. Pt instructed to call with any concerns or problems.

## 2017-03-30 ENCOUNTER — TELEPHONE (OUTPATIENT)
Dept: INTERNAL MEDICINE CLINIC | Age: 47
End: 2017-03-30

## 2017-03-30 DIAGNOSIS — Z01.82 ENCOUNTER FOR ALLERGY TESTING: Primary | ICD-10-CM

## 2017-03-30 NOTE — TELEPHONE ENCOUNTER
Patient has an appt on 4/14/17 at 9:30am    Dr. Iris De Leon of [address] [place] [address]. [address]  [phone]  F: [phone]    Patient is being seen for food allergy testing.     [facility] ([facility] [[id number]]  ID/Cert# [id number]

## 2017-03-31 LAB
BAKER'S YEAST IGG QN: <20 UNITS (ref 0–24.9)
ERYTHROCYTE [DISTWIDTH] IN BLOOD BY AUTOMATED COUNT: 12.5 % (ref 12.3–15.4)
GLIADIN IGG SER IA-ACNC: 3 UNITS (ref 0–19)
GLIADIN PEPTIDE IGA SER-ACNC: 3 UNITS (ref 0–19)
GLIADIN PEPTIDE IGG SER-ACNC: 2 UNITS (ref 0–19)
GLIADIN PEPTIDE+TTG IGA+IGG SER QL IA: NEGATIVE
HCT VFR BLD AUTO: 42.7 % (ref 34–46.6)
HGB BLD-MCNC: 14 G/DL (ref 11.1–15.9)
IGA SERPL-MCNC: 145 MG/DL (ref 87–352)
Lab: NORMAL
MCH RBC QN AUTO: 29.8 PG (ref 26.6–33)
MCHC RBC AUTO-ENTMCNC: 32.8 G/DL (ref 31.5–35.7)
MCV RBC AUTO: 91 FL (ref 79–97)
NOTE, 164153: NORMAL
P-ANCA ATYPICAL SER QL IF: NEGATIVE
PLATELET # BLD AUTO: 249 X10E3/UL (ref 150–379)
RBC # BLD AUTO: 4.7 X10E6/UL (ref 3.77–5.28)
TTG IGA SER-ACNC: <2 U/ML (ref 0–3)
TTG IGG SER-ACNC: <2 U/ML (ref 0–5)
WBC # BLD AUTO: 4.9 X10E3/UL (ref 3.4–10.8)

## 2017-04-06 ENCOUNTER — TELEPHONE (OUTPATIENT)
Dept: INTERNAL MEDICINE CLINIC | Age: 47
End: 2017-04-06

## 2017-04-06 NOTE — TELEPHONE ENCOUNTER
Pt had requested referral to Dr Mikala Danielle for apt on 4/14/17. While obtaining referral [facility] advised that Dr Yenni Willett is out of network and unable to complete the request. Did fax notification to Dr Georgi Frederick office at [phone].

## 2017-04-11 NOTE — TELEPHONE ENCOUNTER
Pt called back with updated referral information.     Dr. Amira Young, [address] [facility] [address]  [place], [address] [facility]  (G)[phone]  (W)[phone]    Patient is being seen for food allergy testing - 4/28/17 8am     [facility] ([facility] [place] [facility] [[id number]]  ID/Cert# [id number]

## 2017-04-14 ENCOUNTER — OFFICE VISIT (OUTPATIENT)
Dept: INTERNAL MEDICINE CLINIC | Age: 47
End: 2017-04-14

## 2017-04-14 VITALS
HEIGHT: 68 IN | SYSTOLIC BLOOD PRESSURE: 127 MMHG | DIASTOLIC BLOOD PRESSURE: 80 MMHG | TEMPERATURE: 98.4 F | OXYGEN SATURATION: 98 % | BODY MASS INDEX: 27.83 KG/M2 | HEART RATE: 69 BPM | WEIGHT: 183.6 LBS

## 2017-04-14 DIAGNOSIS — R35.0 URINARY FREQUENCY: Primary | ICD-10-CM

## 2017-04-14 LAB
BILIRUB UR QL STRIP: NEGATIVE
GLUCOSE UR-MCNC: NORMAL MG/DL
KETONES P FAST UR STRIP-MCNC: NEGATIVE MG/DL
PH UR STRIP: 5 [PH] (ref 4.6–8)
PROT UR QL STRIP: NORMAL MG/DL
SP GR UR STRIP: 1.01 (ref 1–1.03)
UA UROBILINOGEN AMB POC: NORMAL (ref 0.2–1)
URINALYSIS CLARITY POC: CLEAR
URINALYSIS COLOR POC: NORMAL
URINE BLOOD POC: NEGATIVE
URINE LEUKOCYTES POC: NEGATIVE
URINE NITRITES POC: POSITIVE

## 2017-04-14 RX ORDER — NITROFURANTOIN 25; 75 MG/1; MG/1
100 CAPSULE ORAL 2 TIMES DAILY
Qty: 14 CAP | Refills: 0 | Status: SHIPPED | OUTPATIENT
Start: 2017-04-14 | End: 2017-08-17 | Stop reason: ALTCHOICE

## 2017-04-14 NOTE — PROGRESS NOTES
HISTORY OF PRESENT ILLNESS  Archie Hester is a 48 y.o. female. HPI     She complains of dysuria and suprapubic pain that have been present for the past two days. Pt denies hematuria or darker colored urine. She also denies fever, chills, or back pain. She took Azo yesterday. Pt feels like her bladder has prolapsed. Pt states that she got new insurance and is now only able to see me and no other specialists. Pt requests a new GYN or urogynecologist.    Review of Systems   Gastrointestinal: Positive for abdominal pain. Genitourinary: Positive for dysuria. All other systems reviewed and are negative. Physical Exam   Constitutional: She is oriented to person, place, and time. She appears well-developed and well-nourished. HENT:   Head: Normocephalic and atraumatic. Right Ear: External ear normal.   Left Ear: External ear normal.   Nose: Nose normal.   Mouth/Throat: Oropharynx is clear and moist.   Eyes: Conjunctivae and EOM are normal.   Neck: Normal range of motion. Neck supple. Carotid bruit is not present. No thyroid mass and no thyromegaly present. Cardiovascular: Normal rate, regular rhythm, S1 normal, S2 normal, normal heart sounds and intact distal pulses. Pulmonary/Chest: Effort normal and breath sounds normal.   Abdominal: Soft. Normal appearance and bowel sounds are normal. There is no hepatosplenomegaly. There is no tenderness. Musculoskeletal: Normal range of motion. Neurological: She is alert and oriented to person, place, and time. She has normal strength. No cranial nerve deficit or sensory deficit. Coordination normal.   Skin: Skin is warm, dry and intact. No abrasion and no rash noted. Psychiatric: She has a normal mood and affect. Her behavior is normal. Judgment and thought content normal.   Nursing note and vitals reviewed. ASSESSMENT and PLAN  Ashwini Kumar was seen today for urinary frequency and urinary burning.     Diagnoses and all orders for this visit:    Urinary frequency  Pt to begin taking Macrobid as directed. I referred pt to urogynecologist Dr. Lida Betts. I told pt that she should have pap performed every three years and I can do pap and order mammogram for her. -     AMB POC URINALYSIS DIP STICK AUTO W/O MICRO  -     CULTURE, URINE    Other orders  -     nitrofurantoin, macrocrystal-monohydrate, (MACROBID) 100 mg capsule; Take 1 Cap by mouth two (2) times a day. lab results and schedule of future lab studies reviewed with patient  reviewed medications and side effects in detail     Written by Preet Brock, as dictated by Suzan Clark MD.     Current diagnosis and concerns discussed with pt at length. Understands risks and benefits or current treatment plan and medications and accepts the treatment and medication with any possible risks. Pt asks appropriate questions which were answered. Pt instructed to call with any concerns or problems.

## 2017-04-17 LAB — BACTERIA UR CULT: ABNORMAL

## 2017-04-25 NOTE — TELEPHONE ENCOUNTER
Referral obtained and faxed to Dr Charlene Zimmer office at [phone]. Auth # [id number]  00 visits  4/25/17-4/25/18.

## 2017-06-06 ENCOUNTER — OFFICE VISIT (OUTPATIENT)
Dept: INTERNAL MEDICINE CLINIC | Age: 47
End: 2017-06-06

## 2017-06-06 VITALS
SYSTOLIC BLOOD PRESSURE: 109 MMHG | RESPIRATION RATE: 18 BRPM | OXYGEN SATURATION: 99 % | WEIGHT: 185 LBS | HEIGHT: 68 IN | HEART RATE: 75 BPM | BODY MASS INDEX: 28.04 KG/M2 | DIASTOLIC BLOOD PRESSURE: 77 MMHG | TEMPERATURE: 98.1 F

## 2017-06-06 DIAGNOSIS — Z00.00 WELL ADULT EXAM: Primary | ICD-10-CM

## 2017-06-06 NOTE — MR AVS SNAPSHOT
Visit Information Date & Time Provider Department Dept. Phone Encounter #  
 6/6/2017  1:45 PM Faheem Gao MD [facility] [address] [id number] Your Appointments 6/30/2017  8:40 AM  
Follow Up with Hosea Ivory MD  
[facility] Appt Note: follow up headache  $0CP  giana  [birth year]/30/[birth year] [address] [place] [address] [place] [address] [phone]  
  
   
 [address] [id number] [address] [place] Upcoming Health Maintenance Date Due INFLUENZA AGE 9 TO ADULT 8/1/2017 PAP AKA CERVICAL CYTOLOGY 6/6/2020 DTaP/Tdap/Td series (2 - Td) 7/21/2026 Allergies as of 6/6/2017  Review Complete On: 6/6/2017 By: Faheem Gao MD  
  
 Severity Noted Reaction Type Reactions Codeine  08/18/2010    Not Reported This Time Compazine [Prochlorperazine Edisylate]  08/18/2010    Not Reported This Time  
 Pcn [Penicillins]  08/18/2010    Not Reported This Time Prednisone  08/18/2010    Other (comments)  
 tach Current Immunizations  Reviewed on 8/23/2016 Name Date Hep B Vaccine (Adult) 8/23/2016, 7/21/2016 Influenza Vaccine 9/11/2016 Influenza Vaccine Split 10/4/2012 Tdap 7/21/2016 Not reviewed this visit You Were Diagnosed With   
  
 Codes Comments Well adult exam    -  Primary ICD-10-CM: Z00.00 ICD-9-CM: V70.0 Vitals BP Pulse Temp Resp Height(growth percentile) Weight(growth percentile) 109/77 75 98.1 °F (36.7 °C) (Oral) 18 5' 8\" (1.727 m) 185 lb (83.9 kg) SpO2 BMI OB Status Smoking Status 99% 28.13 kg/m2 Hysterectomy Never Smoker Vitals History BMI and BSA Data Body Mass Index Body Surface Area  
 28.13 kg/m 2 2.01 m 2 Preferred Pharmacy Pharmacy Name Phone [id number] [address] AT [facility] [phone] Your Updated Medication List  
  
   
This list is accurate as of: 6/6/17  2:55 PM.  Always use your most recent med list.  
  
  
  
  
 Biotin 2,500 mcg Cap Take 7,500 mcg by mouth daily. CALCIUM PO Take  by mouth. CO Q-10 10 mg Cap Generic drug:  coenzyme q10 Take  by mouth. EXCEDRIN MIGRAINE PO Take  by mouth. IMMUNE SUPPORT COMPLEX PO Take  by mouth.  
  
 levothyroxine 200 mcg tablet Commonly known as:  SYNTHROID Take 1 Tab by mouth Daily (before breakfast). multivitamin tablet Commonly known as:  ONE A DAY Take 1 Tab by mouth daily. nitrofurantoin (macrocrystal-monohydrate) 100 mg capsule Commonly known as:  MACROBID Take 1 Cap by mouth two (2) times a day. OTHER Take  by mouth daily. SUMAtriptan 100 mg tablet Commonly known as:  IMITREX 1 po every day prn only * topiramate 25 mg tablet Commonly known as:  TOPAMAX TAKE 3 TABLETS BY MOUTH EVERY NIGHT AT BEDTIME FOR MIGRIANE PREVENTION  
  
 * topiramate 100 mg tablet Commonly known as:  TOPAMAX Take 1 Tab by mouth nightly. * Notice: This list has 2 medication(s) that are the same as other medications prescribed for you. Read the directions carefully, and ask your doctor or other care provider to review them with you. We Performed the Following REFERRAL FOR COLONOSCOPY [WZB386 Custom] Comments:  
 Please evaluate patient for screening colonscopy. REFERRAL TO DERMATOLOGY [REF19 Custom] To-Do List   
 06/06/2017 Imaging:  [facility] BI SCREENING INCL CAD Referral Information Referral ID Referred By Referred To  
  
 [id number] Claudio Flood MD   
   [address] [place] [address] Phone: [phone] Fax: [phone] Visits Status Start Date End Date 1 New Request 6/6/17 6/6/18  If your referral has a status of pending review or denied, additional information will be sent to support the outcome of this decision. Referral ID Referred By Referred To  
 [id number] EVETTE, [address]  [place], [address] [facility] Visits Status Start Date End Date 1 New Request 6/6/17 6/6/18 If your referral has a status of pending review or denied, additional information will be sent to support the outcome of this decision. Introducing [facility] SERVICES! Dear Sowmya Remy: Thank you for requesting a Web [employer] Services account. Our records indicate that you already have an active Web [employer] Services account. You can access your account anytime at [email or web address] Did you know that you can access your hospital and ER discharge instructions at any time in Web [employer] Services? You can also review all of your test results from your hospital stay or ER visit. Additional Information If you have questions, please visit the Frequently Asked Questions section of the Web Geo Services website at [email or web address] Remember, Web [employer] Services is NOT to be used for urgent needs. For medical emergencies, dial 911. Now available from your iPhone and Android! Please provide this summary of care documentation to your next provider. Your primary care clinician is listed as Darien Zuniga. If you have any questions after today's visit, please call [phone].

## 2017-06-06 NOTE — PROGRESS NOTES
Evelyn Humphries is a 48 y.o. female and presents with Complete Physical (with Pap)  . Subjective:  Pt here for annual and pap (hysterectomy). Migraine  topamax 100 mg /daily  Headaches once /week but not migraine      SUBJECTIVE: Evelyn Humphries is a 48 y.o. female here for follow up of Hashimoto's Disease and history of thyroid cancer. Lab Results   Component Value Date/Time    TSH 2.900 01/17/2017 11:57 AM     Thyroid ROS: denies fatigue, weight changes, heat/cold intolerance, bowel/skin changes or CVS symptoms. Dionte            Review of Systems  Constitutional: negative for fevers, chills, anorexia and weight loss  Eyes:   negative for visual disturbance and irritation  ENT:   negative for tinnitus,sore throat,nasal congestion,ear pains. hoarseness  Respiratory:  negative for cough, hemoptysis, dyspnea,wheezing  CV:   negative for chest pain, palpitations, lower extremity edema  GI:   negative for nausea, vomiting, diarrhea, abdominal pain,melena  Endo:               negative for polyuria,polydipsia,polyphagia,heat intolerance  Genitourinary: negative for frequency, dysuria and hematuria  Integument:  negative for rash and pruritus  Hematologic:  negative for easy bruising and gum/nose bleeding  Musculoskel: negative for myalgias, arthralgias, back pain, muscle weakness, joint pain  Neurological:  negative for headaches, dizziness, vertigo, memory problems and gait   Behavl/Psych: negative for feelings of anxiety, depression, mood changes    Past Medical History:   Diagnosis Date    [facility]     Headache     Thyroid cancer ([facility] 11/2007    history of thyroid cancer, Dr. Ida Okeefe     Past Surgical History:   Procedure Laterality Date    HX HEENT      thyroidectomy    HX BORIS AND BSO      ovarian cyst, hysterctomy    THYROIDECTOMY  11/07     Social History     Social History    Marital status:      Spouse name: N/A    Number of children: N/A    Years of education: N/A Social History Main Topics    Smoking status: Never Smoker    Smokeless tobacco: Never Used    Alcohol use No    Drug use: No    Sexual activity: Yes     Partners: Male     Other Topics Concern    None     Social History Narrative    2 part time jobs     and                 19, 21, 20 healthy         Family History   Problem Relation Age of Onset    Cancer Mother      breast cancer, thyroid cancer    Headache Mother     Cancer Sister      thyroid cancer    Cancer Father      multiple myeloma    Headache Father     Stroke Maternal Grandmother      Current Outpatient Prescriptions   Medication Sig Dispense Refill    SUMAtriptan (IMITREX) 100 mg tablet 1 po every day prn only 12 Tab 2    multivitamin (ONE A DAY) tablet Take 1 Tab by mouth daily.  levothyroxine (SYNTHROID) 200 mcg tablet Take 1 Tab by mouth Daily (before breakfast). 30 Tab 2    topiramate (TOPAMAX) 100 mg tablet Take 1 Tab by mouth nightly. 30 Tab 6    Biotin 2,500 mcg cap Take 7,500 mcg by mouth daily.  ASPIRIN/ACETAMINOPHEN/CAFFEINE (EXCEDRIN MIGRAINE PO) Take  by mouth.  coenzyme q10 (CO Q-10) 10 mg cap Take  by mouth.  VIT C/ZN/K.GINSG/МАРИЯ/HRB62 (IMMUNE SUPPORT COMPLEX PO) Take  by mouth.  CALCIUM PO Take  by mouth.  nitrofurantoin, macrocrystal-monohydrate, (MACROBID) 100 mg capsule Take 1 Cap by mouth two (2) times a day. 14 Cap 0    topiramate (TOPAMAX) 25 mg tablet TAKE 3 TABLETS BY MOUTH EVERY NIGHT AT BEDTIME FOR MIGRIANE PREVENTION 270 Tab 1    OTHER Take  by mouth daily.        Allergies   Allergen Reactions    Codeine Not Reported This Time    Compazine [Prochlorperazine Edisylate] Not Reported This Time    Pcn [Penicillins] Not Reported This Time    Prednisone Other (comments)     tach       Objective:  Visit Vitals    /77    Pulse 75    Temp 98.1 °F (36.7 °C) (Oral)    Resp 18    Ht 5' 8\" (1.727 m)    Wt 185 lb (83.9 kg)    SpO2 99%    BMI 28.13 kg/m2     Physical Exam:   General appearance - alert, well appearing, and in no distress  Mental status - alert, oriented to person, place, and time  EYE-ASHLEY, EOMI, corneas normal, no foreign bodies, visual acuity normal both eyes, no periorbital cellulitis  ENT-ENT exam normal, no neck nodes or sinus tenderness  Nose - normal and patent, no erythema, discharge or polyps  Mouth - mucous membranes moist, pharynx normal without lesions  Neck - supple, no significant adenopathy   Chest - clear to auscultation, no wheezes, rales or rhonchi, symmetric air entry   Heart - normal rate, regular rhythm, normal S1, S2, no murmurs, rubs, clicks or gallops   Abdomen - soft, nontender, nondistended, no masses or organomegaly  Lymph- no adenopathy palpable  Ext-peripheral pulses normal, no pedal edema, no clubbing or cyanosis  Skin-Warm and dry.  no hyperpigmentation, vitiligo, or suspicious lesions  Neuro -alert, oriented, normal speech, no focal findings or movement disorder noted  Neck-normal C-spine, no tenderness, full ROM without pain      Results for orders placed or performed in visit on 04/14/17   CULTURE, URINE   Result Value Ref Range    Urine Culture, Routine (A)      Escherichia coli  Greater than 100,000 colony forming units per mL         Susceptibility    Escherichia coli -  (no method available)*     Amoxicillin/Clavulanic A S Susceptible      Ampicillin ($) S Susceptible      Cefepime ($$) S Susceptible      Ceftriaxone ($) S Susceptible      Cefuroxime ($) S Susceptible      Cephalothin S Susceptible      Ciprofloxacin ($) S Susceptible      Ertapenem ($$$$) S Susceptible      Gentamicin ($) S Susceptible      Imipenem S Susceptible      Levofloxacin ($) S Susceptible      Nitrofurantoin S Susceptible      Piperacillin S Susceptible      Tetracycline S Susceptible      Tobramycin ($) S Susceptible      Trimeth-Sulfamethoxa S Susceptible      * Performed at:  [address] [place], [place]  [id number] Director: Abdulaziz Garcia MD, Phone:  [phone]   AMB POC URINALYSIS DIP STICK AUTO W/O MICRO   Result Value Ref Range    Color (UA POC) Orange     Clarity (UA POC) Clear     Glucose (UA POC) Trace Negative    Bilirubin (UA POC) Negative Negative    Ketones (UA POC) Negative Negative    Specific gravity (UA POC) 1.015 1.001 - 1.035    Blood (UA POC) Negative Negative    pH (UA POC) 5 4.6 - 8.0    Protein (UA POC) 1+ Negative mg/dL    Urobilinogen (UA POC) 2 mg/dL 0.2 - 1    Nitrites (UA POC) Positive Negative    Leukocyte esterase (UA POC) Negative Negative     Prevention    Cardiovascular profile  Family hx  Exercisin days a week 20 minutes  Blood pressure:  Health healthy diet:  Diabetes:  Cholesterol:  Renal function:      Cancer risk profile  Mammogram  Lung  Colonoscopy   Skin nonhealing in 2 weeks derm  Gyn abnormal bleeding/discharge/abd pain/pressure      Thyroid sx  Dr. Yvonne Topete    Osteopenia prevention  Calcium 1000mg/day yes  Vitamin D 800iu/day yes    Mental health scale: 9/10  Depression  Anxiety  Sleep # of hours:  Energy Level:        Immunizations  TDAP  Pneumonia vaccine  Flu vaccine  Shingles vaccine  HPV      Labs were done at work      Janel Larson was seen today for complete physical.    Diagnoses and all orders for this visit:    Well adult exam  Pt presents in overall good health. She is followed by endocrine and had labs with cholesterol recently done at work and reportedly wnl. She has a family hx for cancer so encouraged compliance with specialists for screening. Follow up in 1 year or prn with pcp  -     FAUSTO MAMMO BI SCREENING INCL CAD; Future  -     REFERRAL TO DERMATOLOGY  -     REFERRAL FOR COLONOSCOPY        This note will not be viewable in MyChart.

## 2017-06-12 ENCOUNTER — HOSPITAL ENCOUNTER (OUTPATIENT)
Dept: MAMMOGRAPHY | Age: 47
Discharge: HOME | End: 2017-06-12
Attending: INTERNAL MEDICINE
Payer: COMMERCIAL

## 2017-06-12 DIAGNOSIS — Z00.00 WELL ADULT EXAM: ICD-10-CM

## 2017-06-12 PROCEDURE — 77063 BREAST TOMOSYNTHESIS BI: CPT

## 2017-07-17 ENCOUNTER — TELEPHONE (OUTPATIENT)
Dept: INTERNAL MEDICINE CLINIC | Age: 47
End: 2017-07-17

## 2017-07-17 DIAGNOSIS — G43.909 MIGRAINE WITHOUT STATUS MIGRAINOSUS, NOT INTRACTABLE, UNSPECIFIED MIGRAINE TYPE: ICD-10-CM

## 2017-07-17 DIAGNOSIS — Z01.419 ENCOUNTER FOR GYNECOLOGICAL EXAMINATION WITHOUT ABNORMAL FINDING: Primary | ICD-10-CM

## 2017-07-27 ENCOUNTER — TELEPHONE (OUTPATIENT)
Dept: INTERNAL MEDICINE CLINIC | Age: 47
End: 2017-07-27

## 2017-07-27 NOTE — TELEPHONE ENCOUNTER
Referral obtained and faxed to  [facility] office at [phone]. Auth # [id number] 28 visits  7/26/17-7/26/18. Referral obtained and faxed to Dr Becky Libman office at [phone]. Auth # [id number]  02 visits  7/28/17-7/28/18.

## 2017-08-17 ENCOUNTER — TELEPHONE (OUTPATIENT)
Dept: INTERNAL MEDICINE CLINIC | Age: 47
End: 2017-08-17

## 2017-08-17 ENCOUNTER — OFFICE VISIT (OUTPATIENT)
Dept: INTERNAL MEDICINE CLINIC | Age: 47
End: 2017-08-17

## 2017-08-17 VITALS
HEIGHT: 68 IN | OXYGEN SATURATION: 98 % | WEIGHT: 185 LBS | RESPIRATION RATE: 19 BRPM | HEART RATE: 95 BPM | DIASTOLIC BLOOD PRESSURE: 81 MMHG | BODY MASS INDEX: 28.04 KG/M2 | SYSTOLIC BLOOD PRESSURE: 125 MMHG | TEMPERATURE: 99 F

## 2017-08-17 DIAGNOSIS — Z12.83 SCREENING FOR MALIGNANT NEOPLASM OF SKIN: Primary | ICD-10-CM

## 2017-08-17 DIAGNOSIS — M22.2X1 PATELLOFEMORAL SYNDROME OF RIGHT KNEE: Primary | ICD-10-CM

## 2017-08-17 NOTE — PATIENT INSTRUCTIONS
Patellofemoral Pain Syndrome: Care Instructions  Your Care Instructions  Patellofemoral pain syndrome is pain in the front of the knee. It is caused by overuse, weak thigh muscles (quadriceps), or a problem with the way the kneecap moves. Extra weight may also cause this syndrome. The patella is the kneecap, and the femur is the thighbone. Some people may have pain in the front of the knee from a condition called chondromalacia. In this problem, the underside of the knee cartilage wears down and frays. Cartilage is a rubbery tissue that cushions joints. In some cases, the kneecap does not move, or track, in a normal way. You may have knee pain when you run, walk down hills or steps, or do another activity. Sitting for a long time also can cause knee pain. Your knee pain may get better with medicines for pain and swelling and exercises to make your quadriceps stronger. Losing weight, if you need to, may also help with pain. Follow-up care is a key part of your treatment and safety. Be sure to make and go to all appointments, and call your doctor if you are having problems. It's also a good idea to know your test results and keep a list of the medicines you take. How can you care for yourself at home? · Ask your doctor if you can take an over-the-counter pain medicine, such as acetaminophen (Tylenol), ibuprofen (Advil, Motrin), or naproxen (Aleve). Be safe with medicines. Read and follow all instructions on the label. · Rest and protect your knee. Take a break from activities that cause pain, such as long periods of sitting or kneeling. · Put ice or a cold pack on your knee for 10 to 20 minutes after activity. Put a thin cloth between the ice and your skin. · If your doctor recommends an elastic bandage, sleeve, or other type of support for your knee, wear it as directed. · If your knee is not swollen, you can put moist heat, a heating pad, or a warm cloth on your knee.  After several days of rest, you can begin gentle exercise of your knee. · Reach and stay at a healthy weight. Being overweight puts stress on your knees. · Wear athletic shoes that offer good support, especially if you run. · Use shoe inserts, or orthotics, if they help reduce your knee pain. Many drugstores and shoe stores sell them. · See a physical therapist to learn more exercises and stretches to make your legs stronger. When should you call for help? Watch closely for changes in your health, and be sure to contact your doctor if:  · Your knee pain does not get better or gets worse. Where can you learn more? Go to http://luz-jos.info/. Enter G012 in the search box to learn more about \"Patellofemoral Pain Syndrome: Care Instructions. \"  Current as of: March 21, 2017  Content Version: 11.3  © 0618-9437 [employer]. Care instructions adapted under license by Medium (which disclaims liability or warranty for this information). If you have questions about a medical condition or this instruction, always ask your healthcare professional. [address] any warranty or liability for your use of this information.

## 2017-08-17 NOTE — TELEPHONE ENCOUNTER
Dr Winsome Waite [facility] [address])    [address] [address]    Phone [phone]    Fax [phone]    Full Body Check for Cancer    08/23/17  2:00 PM         [facility])     [phone]

## 2017-08-17 NOTE — MR AVS SNAPSHOT
Visit Information Date & Time Provider Department Dept. Phone Encounter #  
 8/17/2017  3:30 PM Bianca Duncan MD [facility] [address] [place] [id number] Follow-up Instructions Return if symptoms worsen or fail to improve. Your Appointments 10/2/2017  3:40 PM  
Follow Up with Brooks Seals MD  
[facility]) Appt Note: follow up headache  $0CP  giana  12/30/16; Phytel Cancellation; f/up migraine 07/13/17 hb; r/s from 7/28/17/kharris/7/28/17  
 [place] [address] [facility] [address] [facility] [address] [address] [phone]  
  
   
 [place] [address] [address] [address] Upcoming Health Maintenance Date Due INFLUENZA AGE 9 TO ADULT 8/1/2017 PAP AKA CERVICAL CYTOLOGY 6/6/2020 DTaP/Tdap/Td series (2 - Td) 7/21/2026 Allergies as of 8/17/2017  Review Complete On: 8/17/2017 By: Erendira Chowdhury LPN Severity Noted Reaction Type Reactions Codeine  08/18/2010    Not Reported This Time Compazine [Prochlorperazine Edisylate]  08/18/2010    Not Reported This Time  
 Pcn [Penicillins]  08/18/2010    Not Reported This Time Prednisone  08/18/2010    Other (comments)  
 tach Current Immunizations  Reviewed on 8/23/2016 Name Date Hep B Vaccine (Adult) 8/23/2016, 7/21/2016 Influenza Vaccine 9/11/2016 Influenza Vaccine Split 10/4/2012 Tdap 7/21/2016 Not reviewed this visit You Were Diagnosed With   
  
 Codes Comments Patellofemoral syndrome of right knee    -  Primary ICD-10-CM: M22.2X1 ICD-9-CM: 719.46 Vitals BP Pulse Temp Resp Height(growth percentile) Weight(growth percentile) 125/81 (BP 1 Location: Left arm, BP Patient Position: Sitting) 95 99 °F (37.2 °C) (Oral) 19 5' 8\" (1.727 m) 185 lb (83.9 kg) SpO2 BMI OB Status Smoking Status 98% 28.13 kg/m2 Hysterectomy Never Smoker Vitals History BMI and BSA Data Body Mass Index Body Surface Area  
 28.13 kg/m 2 2.01 m 2 Preferred Pharmacy Pharmacy Name Phone [id number]- [place], [address] AT [facility] [phone] Your Updated Medication List  
  
   
This list is accurate as of: 8/17/17  4:00 PM.  Always use your most recent med list.  
  
  
  
  
 CALCIUM PO Take  by mouth. EXCEDRIN MIGRAINE PO Take  by mouth as needed. levothyroxine 200 mcg tablet Commonly known as:  SYNTHROID Take 1 Tab by mouth Daily (before breakfast). multivitamin tablet Commonly known as:  ONE A DAY Take 1 Tab by mouth daily. SUMAtriptan 100 mg tablet Commonly known as:  IMITREX 1 po every day prn only  
  
 topiramate 100 mg tablet Commonly known as:  TOPAMAX Take 1 Tab by mouth nightly. We Performed the Following REFERRAL TO PHYSICAL THERAPY [ZCX85 Custom] Follow-up Instructions Return if symptoms worsen or fail to improve. Referral Information Referral ID Referred By Referred To  
  
 [id number] Berta Fuenets Not Available Visits Status Start Date End Date 1 New Request 8/17/17 8/17/18 If your referral has a status of pending review or denied, additional information will be sent to support the outcome of this decision. Patient Instructions Patellofemoral Pain Syndrome: Care Instructions Your Care Instructions Patellofemoral pain syndrome is pain in the front of the knee. It is caused by overuse, weak thigh muscles (quadriceps), or a problem with the way the kneecap moves. Extra weight may also cause this syndrome. The patella is the kneecap, and the femur is the thighbone. Some people may have pain in the front of the knee from a condition called chondromalacia. In this problem, the underside of the knee cartilage wears down and frays. Cartilage is a rubbery tissue that cushions joints. In some cases, the kneecap does not move, or track, in a normal way. You may have knee pain when you run, walk down hills or steps, or do another activity. Sitting for a long time also can cause knee pain. Your knee pain may get better with medicines for pain and swelling and exercises to make your quadriceps stronger. Losing weight, if you need to, may also help with pain. Follow-up care is a key part of your treatment and safety. Be sure to make and go to all appointments, and call your doctor if you are having problems. It's also a good idea to know your test results and keep a list of the medicines you take. How can you care for yourself at home? · Ask your doctor if you can take an over-the-counter pain medicine, such as acetaminophen (Tylenol), ibuprofen (Advil, Motrin), or naproxen (Aleve). Be safe with medicines. Read and follow all instructions on the label. · Rest and protect your knee. Take a break from activities that cause pain, such as long periods of sitting or kneeling. · Put ice or a cold pack on your knee for 10 to 20 minutes after activity. Put a thin cloth between the ice and your skin. · If your doctor recommends an elastic bandage, sleeve, or other type of support for your knee, wear it as directed. · If your knee is not swollen, you can put moist heat, a heating pad, or a warm cloth on your knee. After several days of rest, you can begin gentle exercise of your knee. · Reach and stay at a healthy weight. Being overweight puts stress on your knees. · Wear athletic shoes that offer good support, especially if you run. · Use shoe inserts, or orthotics, if they help reduce your knee pain. Many drugstores and shoe stores sell them. · See a physical therapist to learn more exercises and stretches to make your legs stronger. When should you call for help? Watch closely for changes in your health, and be sure to contact your doctor if: · Your knee pain does not get better or gets worse. Where can you learn more? Go to http://luz-jos.info/. Enter D679 in the search box to learn more about \"Patellofemoral Pain Syndrome: Care Instructions. \" Current as of: March 21, 2017 Content Version: 11.3 © 9460-2458 [employer]. Care instructions adapted under license by Zurn (which disclaims liability or warranty for this information). If you have questions about a medical condition or this instruction, always ask your healthcare professional. [address] any warranty or liability for your use of this information. Introducing [facility]! Dear Greg Padilla: Thank you for requesting a [employer] account. Our records indicate that you already have an active [employer] account. You can access your account anytime at [email or web address] Did you know that you can access your hospital and ER discharge instructions at any time in [employer]? You can also review all of your test results from your hospital stay or ER visit. Additional Information If you have questions, please visit the Frequently Asked Questions section of the [employer] website at [email or web address] Remember, [employer] is NOT to be used for urgent needs. For medical emergencies, dial 911. Now available from your iPhone and Android! Please provide this summary of care documentation to your next provider. Your primary care clinician is listed as Giovanni Bartlett. If you have any questions after today's visit, please call [phone].

## 2017-08-17 NOTE — PROGRESS NOTES
HISTORY OF PRESENT ILLNESS  Mattie Epps is a 50 y.o. female. HPI  Knee problem:  Mattie Epps presents with right knee pain with gradual onset 6 month(s) ago. Injury: No.  Location of discomfort: patellar, popliteral.  Collapse? -no  Locking? - no  Swelling? no  Warmth ? yes  she does not have history of osteoarthritis. Prior imaging of knee? No.  Aleve helps. Biking, horseback riding, jogging. Patient Active Problem List   Diagnosis Code    Migraine without aura and without status migrainosus, not intractable G43.009    Hypothyroidism due to acquired atrophy of thyroid E03.4     Past Medical History:   Diagnosis Date    [facility]     Headache     [facility]    history of thyroid cancer, Dr. Yola Mayen     Allergies   Allergen Reactions    Codeine Not Reported This Time    Compazine [Prochlorperazine Edisylate] Not Reported This Time    Pcn [Penicillins] Not Reported This Time    Prednisone Other (comments)     tach     Current Outpatient Prescriptions on File Prior to Visit   Medication Sig Dispense Refill    SUMAtriptan (IMITREX) 100 mg tablet 1 po every day prn only 12 Tab 2    multivitamin (ONE A DAY) tablet Take 1 Tab by mouth daily.  levothyroxine (SYNTHROID) 200 mcg tablet Take 1 Tab by mouth Daily (before breakfast). 30 Tab 2    topiramate (TOPAMAX) 100 mg tablet Take 1 Tab by mouth nightly. 30 Tab 6    ASPIRIN/ACETAMINOPHEN/CAFFEINE (EXCEDRIN MIGRAINE PO) Take  by mouth as needed.  CALCIUM PO Take  by mouth. No current facility-administered medications on file prior to visit.       Social History   Substance Use Topics    Smoking status: Never Smoker    Smokeless tobacco: Never Used    Alcohol use No         ROS    Physical Exam  right knee exam:    ROM: normal flexion to 45  Effusion: absent  Warmth:  not significantly changed  Crepitus: mild - over patella    Zina test:  Negative  Stability:  Lachman:  negative  Posterior Drawer:  negative  MCL:  normal  LCL:  normal  Joint line tenderness:  YES  medial  Patellar tendon tenderness:  YES - to inferior patella  Popliteal mass: NO  Visit Vitals    /81 (BP 1 Location: Left arm, BP Patient Position: Sitting)    Pulse 95    Temp 99 °F (37.2 °C) (Oral)    Resp 19    Ht 5' 8\" (1.727 m)    Wt 185 lb (83.9 kg)    SpO2 98%    BMI 28.13 kg/m2     ASSESSMENT and PLAN  Diagnoses and all orders for this visit:    1. Patellofemoral syndrome of right knee  The patient was given written instructions detailing her diagnoses and recommendations made today at the visit. Continue nsaids.  -     REFERRAL TO PHYSICAL THERAPY      Follow-up Disposition:  Return if symptoms worsen or fail to improve.

## 2017-08-21 ENCOUNTER — TELEPHONE (OUTPATIENT)
Dept: INTERNAL MEDICINE CLINIC | Age: 47
End: 2017-08-21

## 2017-08-21 NOTE — TELEPHONE ENCOUNTER
Referral obtained and faxed to Dr Teresa Reilly office at [phone]. Auth # [id number]  31 visits  8/19/17-8/19/18.

## 2017-08-25 ENCOUNTER — TELEPHONE (OUTPATIENT)
Dept: INTERNAL MEDICINE CLINIC | Age: 47
End: 2017-08-25

## 2017-08-25 NOTE — TELEPHONE ENCOUNTER
----- Message from Jr Santo sent at 8/25/2017  9:13 AM EDT -----  Regarding: Dr. Patrick Gutierrez Telephone  Patient needs a referral for her appt she had on 8/23/17 to see Dr. Huan Nunez). Fax# [phone]- address [address] [facility], [address].  Would like a call back [phone]

## 2017-08-28 NOTE — TELEPHONE ENCOUNTER
Note from 8/19/17 - Referral obtained and faxed to Dr Ashly Moffett office at [phone]. Auth # [id number]  79 visits  8/19/17-8/19/18.

## 2017-10-02 ENCOUNTER — OFFICE VISIT (OUTPATIENT)
Dept: NEUROLOGY | Age: 47
End: 2017-10-02

## 2017-10-02 VITALS
BODY MASS INDEX: 27.83 KG/M2 | SYSTOLIC BLOOD PRESSURE: 126 MMHG | WEIGHT: 183.6 LBS | HEIGHT: 68 IN | HEART RATE: 79 BPM | RESPIRATION RATE: 18 BRPM | DIASTOLIC BLOOD PRESSURE: 68 MMHG | TEMPERATURE: 98.4 F | OXYGEN SATURATION: 98 %

## 2017-10-02 DIAGNOSIS — G43.009 MIGRAINE WITHOUT AURA AND WITHOUT STATUS MIGRAINOSUS, NOT INTRACTABLE: Primary | ICD-10-CM

## 2017-10-02 RX ORDER — PROPRANOLOL HYDROCHLORIDE 60 MG/1
60 CAPSULE, EXTENDED RELEASE ORAL DAILY
Qty: 30 CAP | Refills: 6 | Status: SHIPPED | OUTPATIENT
Start: 2017-10-02

## 2017-10-02 NOTE — PATIENT INSTRUCTIONS
[facility]   Statement to Patients  April 1, 2014      In an effort to ensure the large volume of patient prescription refills is processed in the most efficient and expeditious manner, we are asking our patients to assist us by calling your Pharmacy for all prescription refills, this will include also your  Mail Order Pharmacy. The pharmacy will contact our office electronically to continue the refill process. Please do not wait until the last minute to call your pharmacy. We need at least 48 hours (2days) to fill prescriptions. We also encourage you to call your pharmacy before going to  your prescription to make sure it is ready. With regard to controlled substance prescription refill requests (narcotic refills) that need to be picked up at our office, we ask your cooperation by providing us with at least 72 hours (3days) notice that you will need a refill. We will not refill narcotic prescription refill requests after 4:00pm on any weekday, Monday through Thursday, or after 2:00pm on Fridays, or on the weekends. We encourage everyone to explore another way of getting your prescription refill request processed using DearJane, our patient web portal through our electronic medical record system. DearJane is an efficient and effective way to communicate your medication request directly to the office and  downloadable as an chris on your smart phone . DearJane also features a review functionality that allows you to view your medication list as well as leave messages for your physician. Are you ready to get connected? If so please review the attatched instructions or speak to any of our staff to get you set up right away! Thank you so much for your cooperation. Should you have any questions please contact our Practice Administrator.     The Physicians and Staff,  [facility]              Recurring Migraine Headache: Care Instructions  Your Care Instructions  Migraines are painful, throbbing headaches. They often start on one side of the head. They may cause nausea and vomiting and make you sensitive to light, sound, or smell. Some people may have only a few migraines throughout life. Others have them as often as several times a month. The goal of treatment is to reduce the number of migraines you have and relieve your symptoms. Even with treatment, you may continue to have migraines. You play an important role in dealing with your headaches. Work on avoiding things that seem to trigger your migraines. When you feel a headache coming on, act quickly to stop it before it gets worse. Follow-up care is a key part of your treatment and safety. Be sure to make and go to all appointments, and call your doctor if you are having problems. It's also a good idea to know your test results and keep a list of the medicines you take. How can you care for yourself at home? · Do not drive if you have taken a prescription pain medicine. · Rest in a quiet, dark room until your headache is gone. Close your eyes and try to relax or go to sleep. Do not watch TV or read. · Put a cold, moist cloth or cold pack on the painful area for 10 to 20 minutes at a time. Put a thin cloth between the cold pack and your skin. · Have someone gently massage your neck and shoulders. · Take your medicines exactly as prescribed. Call your doctor if you think you are having a problem with your medicine. You will get more details on the specific medicines your doctor prescribes. To prevent migraines  · Keep a headache diary so you can figure out what triggers your headaches. Avoiding triggers may help you prevent headaches. Record when each headache began, how long it lasted, and what the pain was like. Use words like throbbing, aching, stabbing, or dull. Write down any other symptoms you had with the headache.  These may include nausea, flashing lights or dark spots, or sensitivity to bright light or loud noise. Note if the headache occurred near your period. List anything that might have triggered the headache. Triggers may include certain foods (chocolate, cheese, wine) or odors, smoke, bright light, stress, or lack of sleep. · If your doctor has prescribed medicine for your migraines, take it as directed. You may have medicine that you take only when you get a migraine and medicine that you take all the time to help prevent migraines. ¨ If your doctor has prescribed medicine for when you get a headache, take it at the first sign of a migraine, unless your doctor has given you other instructions. ¨ If your doctor has prescribed medicine to prevent migraines, take it exactly as prescribed. Call your doctor if you think you are having a problem with your medicine. · Find healthy ways to deal with stress. Migraines are most common during or right after stressful times. Take time to relax before and after you do something that has caused a migraine in the past.  · Try to keep your muscles relaxed by keeping good posture. Check your jaw, face, neck, and shoulder muscles for tension. Try to relax them. When sitting at a desk, change positions often. Stretch for 30 seconds each hour. · Get regular sleep and exercise. · Eat regular meals, and avoid foods and drinks that often trigger migraines. These include chocolate and alcohol, especially red wine and port. Chemicals used in food, such as aspartame and monosodium glutamate (MSG), also can trigger migraines. So can some food additives, such as those found in hot dogs, johnson, cold cuts, aged cheeses, and pickled foods. · Limit caffeine by not drinking too much coffee, tea, or soda. Do not quit caffeine suddenly, because that can also give you migraines. · Do not smoke or allow others to smoke around you. If you need help quitting, talk to your doctor about stop-smoking programs and medicines.  These can increase your chances of quitting for good. · If you are taking birth control pills or hormone therapy, talk to your doctor about whether they are triggering your migraines. When should you call for help? Call 911 anytime you think you may need emergency care. For example, call if:  · You have symptoms of a stroke. These may include:  ¨ Sudden numbness, tingling, weakness, or loss of movement in your face, arm, or leg, especially on only one side of your body. ¨ Sudden vision changes. ¨ Sudden trouble speaking. ¨ Sudden confusion or trouble understanding simple statements. ¨ Sudden problems with walking or balance. ¨ A sudden, severe headache that is different from past headaches. Call your doctor now or seek immediate medical care if:  · You develop a fever and a stiff neck. · You have new nausea and vomiting, or you cannot keep down food or liquids. Watch closely for changes in your health, and be sure to contact your doctor if:  · You have a headache that does not get better within 1 or 2 days. · Your headaches get worse or happen more often. Where can you learn more? Go to http://luz-jos.info/. Enter V975 in the search box to learn more about \"Recurring Migraine Headache: Care Instructions. \"  Current as of: October 14, 2016  Content Version: 11.3  © 2056-6573 [employer]. Care instructions adapted under license by [employer]ue[employer] (which disclaims liability or warranty for this information). If you have questions about a medical condition or this instruction, always ask your healthcare professional. [place] [address] any warranty or liability for your use of this information. History reviewed and will try to improve headache control performance with the addition of Inderal LA daily in addition to the Topamax. Can continue to use the Imitrex and Aleve at moment 0.   Continue to document headaches and try to keep stress to a minimum and good sleep and a healthy distraction of exercise is always good. Exam looks normal and suggest revisit in 6 months.

## 2017-10-02 NOTE — PROGRESS NOTES
Neurology Consult      Subjective: Anderson Alicea is a 50 y.o. female who returns with recurrent migraines. Not seen in a long while. Is on Topamax 100 mg daily and is getting fairly frequent daily headache. Uses Imitrex with Aleve as a smart combination. Does admit to stress and needs to relax and continue her healthy distraction of exercise.  says she snores but has had sleep study and was told she only had 1 or 2 events per hour. Will add inderal to the Topamax for additional preventative action. Could go higher on the Topamax. Exam looks reassuringly normal and I told her there was one less thing to stress about. Continue to monitor headaches so that she can increase her discovery process and will see her in 6 months. Current Outpatient Prescriptions   Medication Sig Dispense Refill    propranolol LA (INDERAL LA) 60 mg SR capsule Take 1 Cap by mouth daily. 30 Cap 6    SUMAtriptan (IMITREX) 100 mg tablet TAKE 1 TABLET BY MOUTH DAILY AS NEEDED. TAKE AS NEEDED ONLY 12 Tab 0    multivitamin (ONE A DAY) tablet Take 1 Tab by mouth daily.  levothyroxine (SYNTHROID) 200 mcg tablet Take 1 Tab by mouth Daily (before breakfast). 30 Tab 2    topiramate (TOPAMAX) 100 mg tablet Take 1 Tab by mouth nightly. 30 Tab 6    ASPIRIN/ACETAMINOPHEN/CAFFEINE (EXCEDRIN MIGRAINE PO) Take  by mouth as needed.  CALCIUM PO Take  by mouth.           Allergies   Allergen Reactions    Codeine Not Reported This Time    Compazine [Prochlorperazine Edisylate] Not Reported This Time    Pcn [Penicillins] Not Reported This Time    Prednisone Other (comments)     tach     Past Medical History:   Diagnosis Date    [facility] [address]     Headache     Thyroid cancer ([facility] [address] 11/2007    history of thyroid cancer, Dr. Jayce Soares      Past Surgical History:   Procedure Laterality Date    HX HEENT      thyroidectomy    HX BORIS AND BSO      ovarian cyst, hysterctomy    IMPLANT BREAST SILICONE/EQ Bilateral 5524  THYROIDECTOMY  11/07      Social History     Social History    Marital status:      Spouse name: N/A    Number of children: N/A    Years of education: N/A     Occupational History    Not on file. Social History Main Topics    Smoking status: Never Smoker    Smokeless tobacco: Never Used    Alcohol use No    Drug use: No    Sexual activity: Yes     Partners: Male     Other Topics Concern    Not on file     Social History Narrative    2 part time jobs     and                 19, 19, 20 healthy          Family History   Problem Relation Age of Onset    Cancer Mother      breast cancer, thyroid cancer    Headache Mother     Cancer Sister      thyroid cancer    Cancer Father      multiple myeloma    Headache Father     Stroke Maternal Grandmother     Breast Cancer Niece       Visit Vitals    /68    Pulse 79    Temp 98.4 °F (36.9 °C) (Oral)    Resp 18    Ht 5' 8\" (1.727 m)    Wt 83.3 kg (183 lb 9.6 oz)    SpO2 98%    BMI 27.92 kg/m2        Review of Systems:   A comprehensive review of systems was negative except for that written in the HPI. Neuro Exam:     Appearance: The patient is well developed, well nourished, provides a coherent history and is in no acute distress. Mental Status: Oriented to time, place and person. Mood and affect appropriate. Cranial Nerves:   Intact visual fields. Fundi are benign. ASHLEY, EOM's full, no nystagmus, no ptosis. Facial sensation is normal. Corneal reflexes are intact. Facial movement is symmetric. Hearing is normal bilaterally. Palate is midline with normal sternocleidomastoid and trapezius muscles are normal. Tongue is midline. Motor:  5/5 strength in upper and lower proximal and distal muscles. Normal bulk and tone. No fasciculations. Reflexes:   Deep tendon reflexes 2+/4 and symmetrical.   Sensory:   Normal to touch, pinprick and vibration. Gait:  Normal gait. Tremor:   No tremor noted. Cerebellar:  No cerebellar signs present. Neurovascular:  Normal heart sounds and regular rhythm, peripheral pulses intact, and no carotid bruits. Assessment:   Migraine headaches. Will add Inderal LA 60 mg daily to the Topamax. We do have room to go higher on the Topamax. Will continue to advise she monitor her headaches get good sleep minimize stress and keep up the healthy distraction of exercise. Can use the Imitrex at moment 0 with Aleve. Revisit 6 months. Exam looks normal.      Plan:   Revisit 6 months.   Signed by :  Louis Vernon MD

## 2017-10-02 NOTE — MR AVS SNAPSHOT
Visit Information Date & Time Provider Department Dept. Phone Encounter #  
 10/2/2017  3:40 PM Parker Cloud MD [facility] [phone] [id number] Follow-up Instructions Return in about 6 months (around 4/2/2018). Upcoming Health Maintenance Date Due INFLUENZA AGE 9 TO ADULT 8/1/2017 PAP AKA CERVICAL CYTOLOGY 6/6/2020 DTaP/Tdap/Td series (2 - Td) 7/21/2026 Allergies as of 10/2/2017  Review Complete On: 10/2/2017 By: Parker Cloud MD  
  
 Severity Noted Reaction Type Reactions Codeine  08/18/2010    Not Reported This Time Compazine [Prochlorperazine Edisylate]  08/18/2010    Not Reported This Time  
 Pcn [Penicillins]  08/18/2010    Not Reported This Time Prednisone  08/18/2010    Other (comments)  
 tach Current Immunizations  Reviewed on 8/23/2016 Name Date Hep B Vaccine (Adult) 8/23/2016, 7/21/2016 Influenza Vaccine 9/11/2016 Influenza Vaccine Split 10/4/2012 Tdap 7/21/2016 Not reviewed this visit You Were Diagnosed With   
  
 Codes Comments Migraine without aura and without status migrainosus, not intractable    -  Primary ICD-10-CM: G43.009 ICD-9-CM: 346.10 Vitals BP Pulse Temp Resp Height(growth percentile) Weight(growth percentile) 126/68 79 98.4 °F (36.9 °C) (Oral) 18 5' 8\" (1.727 m) 183 lb 9.6 oz (83.3 kg) SpO2 BMI OB Status Smoking Status 98% 27.92 kg/m2 Hysterectomy Never Smoker Vitals History BMI and BSA Data Body Mass Index Body Surface Area  
 27.92 kg/m 2 2 m 2 Preferred Pharmacy Pharmacy Name Phone [id number], [address] [facility] [phone] Your Updated Medication List  
  
   
This list is accurate as of: 10/2/17  4:27 PM.  Always use your most recent med list.  
  
  
  
  
 CALCIUM PO Take  by mouth. EXCEDRIN MIGRAINE PO Take  by mouth as needed. levothyroxine 200 mcg tablet Commonly known as:  SYNTHROID Take 1 Tab by mouth Daily (before breakfast). multivitamin tablet Commonly known as:  ONE A DAY Take 1 Tab by mouth daily. propranolol LA 60 mg SR capsule Commonly known as:  INDERAL LA Take 1 Cap by mouth daily. SUMAtriptan 100 mg tablet Commonly known as:  IMITREX  
TAKE 1 TABLET BY MOUTH DAILY AS NEEDED. TAKE AS NEEDED ONLY  
  
 topiramate 100 mg tablet Commonly known as:  TOPAMAX Take 1 Tab by mouth nightly. Prescriptions Sent to Pharmacy Refills  
 propranolol LA (INDERAL LA) 60 mg SR capsule 6 Sig: Take 1 Cap by mouth daily. Class: Normal  
 Pharmacy: [address] [place] [address] #: [phone] Route: Oral  
  
Follow-up Instructions Return in about 6 months (around 4/2/2018). Patient Instructions PRESCRIPTION REFILL POLICY [facility] Statement to Patients April 1, 2014 In an effort to ensure the large volume of patient prescription refills is processed in the most efficient and expeditious manner, we are asking our patients to assist us by calling your Pharmacy for all prescription refills, this will include also your  Mail Order Pharmacy. The pharmacy will contact our office electronically to continue the refill process. Please do not wait until the last minute to call your pharmacy. We need at least 48 hours (2days) to fill prescriptions. We also encourage you to call your pharmacy before going to  your prescription to make sure it is ready. With regard to controlled substance prescription refill requests (narcotic refills) that need to be picked up at our office, we ask your cooperation by providing us with at least 72 hours (3days) notice that you will need a refill.  
 
We will not refill narcotic prescription refill requests after 4:00pm on any weekday, Monday through Thursday, or after 2:00pm on Fridays, or on the weekends. We encourage everyone to explore another way of getting your prescription refill request processed using Dashbid, our patient web portal through our electronic medical record system. Dashbid is an efficient and effective way to communicate your medication request directly to the office and  downloadable as an chrsi on your smart phone . Dashbid also features a review functionality that allows you to view your medication list as well as leave messages for your physician. Are you ready to get connected? If so please review the attatched instructions or speak to any of our staff to get you set up right away! Thank you so much for your cooperation. Should you have any questions please contact our Practice Administrator. The Physicians and Staff,  [facility] Recurring Migraine Headache: Care Instructions Your Care Instructions Migraines are painful, throbbing headaches. They often start on one side of the head. They may cause nausea and vomiting and make you sensitive to light, sound, or smell. Some people may have only a few migraines throughout life. Others have them as often as several times a month. The goal of treatment is to reduce the number of migraines you have and relieve your symptoms. Even with treatment, you may continue to have migraines. You play an important role in dealing with your headaches. Work on avoiding things that seem to trigger your migraines. When you feel a headache coming on, act quickly to stop it before it gets worse. Follow-up care is a key part of your treatment and safety. Be sure to make and go to all appointments, and call your doctor if you are having problems. It's also a good idea to know your test results and keep a list of the medicines you take. How can you care for yourself at home? · Do not drive if you have taken a prescription pain medicine. · Rest in a quiet, dark room until your headache is gone. Close your eyes and try to relax or go to sleep. Do not watch TV or read. · Put a cold, moist cloth or cold pack on the painful area for 10 to 20 minutes at a time. Put a thin cloth between the cold pack and your skin. · Have someone gently massage your neck and shoulders. · Take your medicines exactly as prescribed. Call your doctor if you think you are having a problem with your medicine. You will get more details on the specific medicines your doctor prescribes. To prevent migraines · Keep a headache diary so you can figure out what triggers your headaches. Avoiding triggers may help you prevent headaches. Record when each headache began, how long it lasted, and what the pain was like. Use words like throbbing, aching, stabbing, or dull. Write down any other symptoms you had with the headache. These may include nausea, flashing lights or dark spots, or sensitivity to bright light or loud noise. Note if the headache occurred near your period. List anything that might have triggered the headache. Triggers may include certain foods (chocolate, cheese, wine) or odors, smoke, bright light, stress, or lack of sleep. · If your doctor has prescribed medicine for your migraines, take it as directed. You may have medicine that you take only when you get a migraine and medicine that you take all the time to help prevent migraines. ¨ If your doctor has prescribed medicine for when you get a headache, take it at the first sign of a migraine, unless your doctor has given you other instructions. ¨ If your doctor has prescribed medicine to prevent migraines, take it exactly as prescribed. Call your doctor if you think you are having a problem with your medicine. · Find healthy ways to deal with stress. Migraines are most common during or right after stressful times.  Take time to relax before and after you do something that has caused a migraine in the past. 
 · Try to keep your muscles relaxed by keeping good posture. Check your jaw, face, neck, and shoulder muscles for tension. Try to relax them. When sitting at a desk, change positions often. Stretch for 30 seconds each hour. · Get regular sleep and exercise. · Eat regular meals, and avoid foods and drinks that often trigger migraines. These include chocolate and alcohol, especially red wine and port. Chemicals used in food, such as aspartame and monosodium glutamate (MSG), also can trigger migraines. So can some food additives, such as those found in hot dogs, johnson, cold cuts, aged cheeses, and pickled foods. · Limit caffeine by not drinking too much coffee, tea, or soda. Do not quit caffeine suddenly, because that can also give you migraines. · Do not smoke or allow others to smoke around you. If you need help quitting, talk to your doctor about stop-smoking programs and medicines. These can increase your chances of quitting for good. · If you are taking birth control pills or hormone therapy, talk to your doctor about whether they are triggering your migraines. When should you call for help? Call 911 anytime you think you may need emergency care. For example, call if: 
· You have symptoms of a stroke. These may include: 
¨ Sudden numbness, tingling, weakness, or loss of movement in your face, arm, or leg, especially on only one side of your body. ¨ Sudden vision changes. ¨ Sudden trouble speaking. ¨ Sudden confusion or trouble understanding simple statements. ¨ Sudden problems with walking or balance. ¨ A sudden, severe headache that is different from past headaches. Call your doctor now or seek immediate medical care if: 
· You develop a fever and a stiff neck. · You have new nausea and vomiting, or you cannot keep down food or liquids. Watch closely for changes in your health, and be sure to contact your doctor if: 
· You have a headache that does not get better within 1 or 2 days. · Your headaches get worse or happen more often. Where can you learn more? Go to http://luz-jos.info/. Enter V975 in the search box to learn more about \"Recurring Migraine Headache: Care Instructions. \" Current as of: October 14, 2016 Content Version: 11.3 © 2895-2388 [employer]. Care instructions adapted under license by [employer] (which disclaims liability or warranty for this information). If you have questions about a medical condition or this instruction, always ask your healthcare professional. [address] any warranty or liability for your use of this information. History reviewed and will try to improve headache control performance with the addition of Inderal LA daily in addition to the Topamax. Can continue to use the Imitrex and Aleve at moment 0. Continue to document headaches and try to keep stress to a minimum and good sleep and a healthy distraction of exercise is always good. Exam looks normal and suggest revisit in 6 months. Introducing [facility]! Dear Suzy Berumen: Thank you for requesting a [employer] account. Our records indicate that you already have an active Not[employer] account. You can access your account anytime at [email or web address] Did you know that you can access your hospital and ER discharge instructions at any time in Not[employer] Technology? You can also review all of your test results from your hospital stay or ER visit. Additional Information If you have questions, please visit the Frequently Asked Questions section of the [employer] website at [email or web address] Remember, Not[employer] Technology is NOT to be used for urgent needs. For medical emergencies, dial 911. Now available from your iPhone and Android! Please provide this summary of care documentation to your next provider. Your primary care clinician is listed as Lázaro Valdez.  If you have any questions after today's visit, please call [phone].

## 2017-10-04 ENCOUNTER — TELEPHONE (OUTPATIENT)
Dept: INTERNAL MEDICINE CLINIC | Age: 47
End: 2017-10-04

## 2017-10-04 NOTE — TELEPHONE ENCOUNTER
Referral to GI    Seeing Nurse Practitioner Qi Ying for Dr Alex Cisneros 10/24/17 at 2 PM  Order in chart    Fax [phone]

## 2017-10-17 ENCOUNTER — TELEPHONE (OUTPATIENT)
Dept: NEUROLOGY | Age: 47
End: 2017-10-17

## 2017-10-17 RX ORDER — TOPIRAMATE 100 MG/1
100 TABLET, FILM COATED ORAL
Qty: 30 TAB | Refills: 6 | Status: SHIPPED | OUTPATIENT
Start: 2017-10-17 | End: 2018-07-09 | Stop reason: SDUPTHER

## 2017-10-17 NOTE — TELEPHONE ENCOUNTER
----- Message from Emberyahaira De La Torre sent at 10/17/2017 12:54 PM EDT -----  Regarding: Dr. Antonio Yan / Thomas Anguiano  Pt stated her and [facility] requested Rx for Topiramate over a week ago, and cannot get a response from the office. Pt is out of her medication. Please call pt soon as possible with any updates.  [phone]

## 2017-10-20 NOTE — TELEPHONE ENCOUNTER
Referral obtained and faxed to [facility] office at [phone].   Auth # [id number] visits  10/20/17-10/20/18

## 2017-10-31 ENCOUNTER — TELEPHONE (OUTPATIENT)
Dept: INTERNAL MEDICINE CLINIC | Age: 47
End: 2017-10-31

## 2017-10-31 DIAGNOSIS — Z01.00 EXAMINATION OF EYES AND VISION: Primary | ICD-10-CM

## 2017-10-31 NOTE — TELEPHONE ENCOUNTER
Referral    Dr. Jacobo Leos [address] [id number]     [phone]  Fax [phone]    Routine eye exam , November 28th @4pm      Pt's contact [phone]

## 2017-11-21 ENCOUNTER — TELEPHONE (OUTPATIENT)
Dept: INTERNAL MEDICINE CLINIC | Age: 47
End: 2017-11-21

## 2017-11-21 NOTE — TELEPHONE ENCOUNTER
Referral obtained and faxed to Ramon Ricci's office at [phone]. Auth# [id number]  81 visits  11/21/17-11/21/18.

## 2022-10-04 ENCOUNTER — TRANSCRIBE ORDER (OUTPATIENT)
Dept: SCHEDULING | Age: 52
End: 2022-10-04

## 2022-10-04 DIAGNOSIS — Z12.31 SCREENING MAMMOGRAM FOR HIGH-RISK PATIENT: Primary | ICD-10-CM

## 2023-01-05 ENCOUNTER — HOSPITAL ENCOUNTER (OUTPATIENT)
Dept: MAMMOGRAPHY | Age: 53
Discharge: HOME | End: 2023-01-05
Attending: OBSTETRICS & GYNECOLOGY
Payer: COMMERCIAL

## 2023-01-05 DIAGNOSIS — Z12.31 SCREENING MAMMOGRAM FOR HIGH-RISK PATIENT: ICD-10-CM

## 2023-01-05 PROCEDURE — 77063 BREAST TOMOSYNTHESIS BI: CPT

## 2023-03-30 ENCOUNTER — TRANSCRIBE ORDER (OUTPATIENT)
Dept: SCHEDULING | Age: 53
End: 2023-03-30

## 2023-03-30 DIAGNOSIS — R10.11 ABDOMINAL PAIN, RIGHT UPPER QUADRANT: Primary | ICD-10-CM

## 2023-04-20 ENCOUNTER — HOSPITAL ENCOUNTER (OUTPATIENT)
Dept: ULTRASOUND IMAGING | Age: 53
Discharge: HOME | End: 2023-04-20
Attending: FAMILY MEDICINE
Payer: COMMERCIAL

## 2023-04-20 DIAGNOSIS — R10.11 ABDOMINAL PAIN, RIGHT UPPER QUADRANT: ICD-10-CM

## 2023-04-20 PROCEDURE — 76705 ECHO EXAM OF ABDOMEN: CPT

## 2023-04-24 DIAGNOSIS — R10.11 ABDOMINAL PAIN, RIGHT UPPER QUADRANT: Primary | ICD-10-CM

## 2023-08-02 ENCOUNTER — APPOINTMENT (OUTPATIENT)
Facility: HOSPITAL | Age: 53
End: 2023-08-02
Payer: COMMERCIAL

## 2023-08-02 ENCOUNTER — HOSPITAL ENCOUNTER (EMERGENCY)
Facility: HOSPITAL | Age: 53
Discharge: HOME | End: 2023-08-02
Attending: STUDENT IN AN ORGANIZED HEALTH CARE EDUCATION/TRAINING PROGRAM
Payer: COMMERCIAL

## 2023-08-02 VITALS
HEIGHT: 68 IN | DIASTOLIC BLOOD PRESSURE: 86 MMHG | TEMPERATURE: 98.1 F | BODY MASS INDEX: 25.76 KG/M2 | SYSTOLIC BLOOD PRESSURE: 158 MMHG | WEIGHT: 170 LBS | HEART RATE: 94 BPM | RESPIRATION RATE: 18 BRPM | OXYGEN SATURATION: 99 %

## 2023-08-02 DIAGNOSIS — K57.90 DIVERTICULOSIS: Primary | ICD-10-CM

## 2023-08-02 DIAGNOSIS — R10.32 ABDOMINAL PAIN, LEFT LOWER QUADRANT: ICD-10-CM

## 2023-08-02 LAB
ALBUMIN SERPL-MCNC: 4.4 G/DL (ref 3.5–5.2)
ALBUMIN/GLOB SERPL: 2 (ref 1.1–2.2)
ALP SERPL-CCNC: 66 U/L (ref 35–104)
ALT SERPL-CCNC: 16 U/L (ref 10–35)
ANION GAP SERPL CALC-SCNC: 11 MMOL/L (ref 5–15)
APPEARANCE UR: CLEAR
AST SERPL-CCNC: 20 U/L (ref 10–35)
BACTERIA URNS QL MICRO: NEGATIVE /HPF
BASOPHILS # BLD: 0.1 K/UL (ref 0–1)
BASOPHILS NFR BLD: 1 % (ref 0–1)
BILIRUB SERPL-MCNC: 0.2 MG/DL (ref 0.2–1)
BILIRUB UR QL: NEGATIVE
BUN SERPL-MCNC: 30 MG/DL (ref 6–20)
BUN/CREAT SERPL: 24 (ref 12–20)
CALCIUM SERPL-MCNC: 8.9 MG/DL (ref 8.6–10)
CHLORIDE SERPL-SCNC: 105 MMOL/L (ref 98–107)
CO2 SERPL-SCNC: 23 MMOL/L (ref 22–29)
COLOR UR: NORMAL
COMMENT:: NORMAL
CREAT SERPL-MCNC: 1.24 MG/DL (ref 0.5–0.9)
DIFFERENTIAL METHOD BLD: ABNORMAL
EOSINOPHIL # BLD: 0.1 K/UL (ref 0–0.4)
EOSINOPHIL NFR BLD: 2 %
EPITH CASTS URNS QL MICRO: NORMAL /LPF
ERYTHROCYTE [DISTWIDTH] IN BLOOD BY AUTOMATED COUNT: 12.7 % (ref 11.5–14.5)
GLOBULIN SER CALC-MCNC: 2.2 G/DL (ref 2–4)
GLUCOSE SERPL-MCNC: 104 MG/DL (ref 65–100)
GLUCOSE UR STRIP.AUTO-MCNC: NEGATIVE MG/DL
HCT VFR BLD AUTO: 41.1 % (ref 35–47)
HGB BLD-MCNC: 13.5 G/DL (ref 11.5–16)
HGB UR QL STRIP: NEGATIVE
IMM GRANULOCYTES # BLD AUTO: 0 K/UL (ref 0–0.04)
IMM GRANULOCYTES NFR BLD AUTO: 0 % (ref 0–0.5)
KETONES UR QL STRIP.AUTO: NEGATIVE MG/DL
LEUKOCYTE ESTERASE UR QL STRIP.AUTO: NEGATIVE
LIPASE SERPL-CCNC: 79 U/L (ref 13–60)
LYMPHOCYTES # BLD: 2.7 K/UL (ref 0.8–3.5)
LYMPHOCYTES NFR BLD: 38 % (ref 12–49)
MCH RBC QN AUTO: 30.1 PG (ref 26–34)
MCHC RBC AUTO-ENTMCNC: 32.8 G/DL (ref 30–36.5)
MCV RBC AUTO: 91.7 FL (ref 80–99)
MONOCYTES # BLD: 0.7 K/UL (ref 0–1)
MONOCYTES NFR BLD: 9 % (ref 5–13)
NEUTS SEG # BLD: 3.7 K/UL (ref 1.8–8)
NEUTS SEG NFR BLD: 50 % (ref 32–75)
NITRITE UR QL STRIP.AUTO: NEGATIVE
NRBC # BLD: 0 K/UL (ref 0–0.01)
NRBC BLD-RTO: 0 PER 100 WBC
PH UR STRIP: 7 (ref 5–8)
PLATELET # BLD AUTO: 299 K/UL (ref 150–400)
PMV BLD AUTO: 9.6 FL (ref 8.9–12.9)
POTASSIUM SERPL-SCNC: 4.2 MMOL/L (ref 3.5–5.1)
PROT SERPL-MCNC: 6.6 G/DL (ref 6.4–8.3)
PROT UR STRIP-MCNC: NEGATIVE MG/DL
RBC # BLD AUTO: 4.48 M/UL (ref 3.8–5.2)
RBC #/AREA URNS HPF: NORMAL /HPF
SODIUM SERPL-SCNC: 139 MMOL/L (ref 136–145)
SP GR UR REFRACTOMETRY: 1.01 (ref 1–1.03)
SPECIMEN HOLD: NORMAL
URINE CULTURE IF INDICATED: NORMAL
UROBILINOGEN UR QL STRIP.AUTO: 0.2 EU/DL (ref 0.2–1)
WBC # BLD AUTO: 7.3 K/UL (ref 3.6–11)
WBC URNS QL MICRO: NORMAL /HPF (ref 0–4)

## 2023-08-02 PROCEDURE — 6360000004 HC RX CONTRAST MEDICATION: Performed by: STUDENT IN AN ORGANIZED HEALTH CARE EDUCATION/TRAINING PROGRAM

## 2023-08-02 PROCEDURE — 36415 COLL VENOUS BLD VENIPUNCTURE: CPT

## 2023-08-02 PROCEDURE — 99285 EMERGENCY DEPT VISIT HI MDM: CPT

## 2023-08-02 PROCEDURE — 85025 COMPLETE CBC W/AUTO DIFF WBC: CPT

## 2023-08-02 PROCEDURE — 83690 ASSAY OF LIPASE: CPT

## 2023-08-02 PROCEDURE — 74177 CT ABD & PELVIS W/CONTRAST: CPT

## 2023-08-02 PROCEDURE — 80053 COMPREHEN METABOLIC PANEL: CPT

## 2023-08-02 PROCEDURE — 81001 URINALYSIS AUTO W/SCOPE: CPT

## 2023-08-02 RX ADMIN — IOPAMIDOL 100 ML: 755 INJECTION, SOLUTION INTRAVENOUS at 20:41

## 2023-08-02 ASSESSMENT — PAIN SCALES - GENERAL: PAINLEVEL_OUTOF10: 6

## 2023-08-02 ASSESSMENT — PAIN - FUNCTIONAL ASSESSMENT: PAIN_FUNCTIONAL_ASSESSMENT: 0-10

## 2023-08-03 NOTE — ED PROVIDER NOTES
EMERGENCY DEPARTMENT PHYSICIAN NOTE     Patient: Gege Constantino     Time of Service: 8/2/2023  7:28 PM     Chief complaint:   Chief Complaint   Patient presents with    Abdominal Pain        HISTORY:  Patient is a 52 y.o. female who presents to the emergency department with complaints of pain started in mid July. Has history of diverticulitis. Started on 7 days of cipro flagyl and finished course of antibiotics a few days ago. Patient stabbing pain and talked to a friend who is a GI doctor who recommended going to the ED for CT scan to rule out abscess. Patient denies fever but does endorse some difficulty eating. Vital signs stable. Patient afebrile      Past Medical History:   Diagnosis Date    Cancer [facility])     Headache     Thyroid cancer ([address] ) 11/2007    history of thyroid cancer, Dr. Holloway Wever        Past Surgical History:   Procedure Laterality Date    HEENT      thyroidectomy    IMPLANT BREAST SILICONE/EQ Bilateral 2772    JENNIFER AND BSO (CERVIX REMOVED)      ovarian cyst, hysterctomy    THYROIDECTOMY  11/07        Family History   Problem Relation Age of Onset    Headache Mother     Breast Cancer Niece     Cancer Mother         breast cancer, thyroid cancer    Cancer Sister         thyroid cancer    Cancer Father         multiple myeloma    Stroke Maternal Grandmother     Headache Father         Social History     Socioeconomic History    Marital status:      Spouse name: None    Number of children: None    Years of education: None    Highest education level: None   Tobacco Use    Smoking status: Never    Smokeless tobacco: Never   Substance and Sexual Activity    Alcohol use: No    Drug use: No   Social History Narrative    2 part time jobs   and         18, 19, 23 healthy          Current Medications: Reviewed in chart. Allergies:    Allergies   Allergen Reactions    Codeine      Other reaction(s): Not Reported This Time    Penicillins      Other reaction(s):

## 2023-08-03 NOTE — DISCHARGE INSTRUCTIONS
You presented to the ED with left lower quadrant pain since mid July. You are concern for diverticulitis. You have taken a course of antibiotics and your pain was still present. CT scan here in the ED shows diverticulosis but no signs of diverticulitis or abscess. Recommend close follow-up with your gastroenterologist for further outpatient treatment evaluation. Advance diet as tolerated.